# Patient Record
Sex: MALE | Race: WHITE | Employment: FULL TIME | URBAN - METROPOLITAN AREA
[De-identification: names, ages, dates, MRNs, and addresses within clinical notes are randomized per-mention and may not be internally consistent; named-entity substitution may affect disease eponyms.]

---

## 2017-05-01 ENCOUNTER — TELEPHONE (OUTPATIENT)
Dept: PAIN MANAGEMENT | Age: 47
End: 2017-05-01

## 2017-05-02 ENCOUNTER — TELEPHONE (OUTPATIENT)
Dept: PAIN MANAGEMENT | Age: 47
End: 2017-05-02

## 2019-09-14 ENCOUNTER — APPOINTMENT (OUTPATIENT)
Dept: GENERAL RADIOLOGY | Age: 49
End: 2019-09-14
Payer: COMMERCIAL

## 2019-09-14 ENCOUNTER — HOSPITAL ENCOUNTER (EMERGENCY)
Age: 49
Discharge: HOME | End: 2019-09-14
Payer: COMMERCIAL

## 2019-09-14 VITALS
SYSTOLIC BLOOD PRESSURE: 125 MMHG | TEMPERATURE: 99.5 F | HEIGHT: 68 IN | OXYGEN SATURATION: 96 % | DIASTOLIC BLOOD PRESSURE: 111 MMHG | RESPIRATION RATE: 18 BRPM | WEIGHT: 181 LBS | HEART RATE: 87 BPM | BODY MASS INDEX: 27.43 KG/M2

## 2019-09-14 DIAGNOSIS — T14.8XXA SUTURED SKIN WOUND: ICD-10-CM

## 2019-09-14 DIAGNOSIS — S61.220A LACERATION OF RIGHT INDEX FINGER WITH FOREIGN BODY WITHOUT DAMAGE TO NAIL, INITIAL ENCOUNTER: ICD-10-CM

## 2019-09-14 DIAGNOSIS — S61.212A LACERATION OF RIGHT MIDDLE FINGER WITHOUT FOREIGN BODY WITHOUT DAMAGE TO NAIL, INITIAL ENCOUNTER: ICD-10-CM

## 2019-09-14 DIAGNOSIS — Z23 TETANUS-DIPHTHERIA (TD) VACCINATION: ICD-10-CM

## 2019-09-14 DIAGNOSIS — S61.121A LACERATION OF RIGHT THUMB WITH FOREIGN BODY AND DAMAGE TO NAIL, INITIAL ENCOUNTER: Primary | ICD-10-CM

## 2019-09-14 PROCEDURE — 4500000023 HC ED LEVEL 3 PROCEDURE

## 2019-09-14 PROCEDURE — 90471 IMMUNIZATION ADMIN: CPT | Performed by: NURSE PRACTITIONER

## 2019-09-14 PROCEDURE — 6360000002 HC RX W HCPCS: Performed by: NURSE PRACTITIONER

## 2019-09-14 PROCEDURE — 73130 X-RAY EXAM OF HAND: CPT

## 2019-09-14 PROCEDURE — 90715 TDAP VACCINE 7 YRS/> IM: CPT | Performed by: NURSE PRACTITIONER

## 2019-09-14 PROCEDURE — 99283 EMERGENCY DEPT VISIT LOW MDM: CPT

## 2019-09-14 PROCEDURE — 2500000003 HC RX 250 WO HCPCS: Performed by: NURSE PRACTITIONER

## 2019-09-14 RX ORDER — LIDOCAINE HYDROCHLORIDE 10 MG/ML
5 INJECTION, SOLUTION EPIDURAL; INFILTRATION; INTRACAUDAL; PERINEURAL ONCE
Status: COMPLETED | OUTPATIENT
Start: 2019-09-14 | End: 2019-09-14

## 2019-09-14 RX ORDER — CEPHALEXIN 500 MG/1
500 CAPSULE ORAL 4 TIMES DAILY
Qty: 40 CAPSULE | Refills: 0 | Status: SHIPPED | OUTPATIENT
Start: 2019-09-14 | End: 2019-09-24

## 2019-09-14 RX ORDER — BUPIVACAINE HYDROCHLORIDE 5 MG/ML
30 INJECTION, SOLUTION EPIDURAL; INTRACAUDAL ONCE
Status: DISCONTINUED | OUTPATIENT
Start: 2019-09-14 | End: 2019-09-14

## 2019-09-14 RX ORDER — HYDROCODONE BITARTRATE AND ACETAMINOPHEN 5; 325 MG/1; MG/1
1 TABLET ORAL EVERY 6 HOURS PRN
Qty: 7 TABLET | Refills: 0 | Status: SHIPPED | OUTPATIENT
Start: 2019-09-14 | End: 2019-09-17

## 2019-09-14 RX ADMIN — LIDOCAINE HYDROCHLORIDE 5 ML: 10 INJECTION, SOLUTION EPIDURAL; INFILTRATION; INTRACAUDAL; PERINEURAL at 14:37

## 2019-09-14 RX ADMIN — TETANUS TOXOID, REDUCED DIPHTHERIA TOXOID AND ACELLULAR PERTUSSIS VACCINE, ADSORBED 0.5 ML: 5; 2.5; 8; 8; 2.5 SUSPENSION INTRAMUSCULAR at 14:37

## 2019-09-14 ASSESSMENT — PAIN DESCRIPTION - PAIN TYPE: TYPE: ACUTE PAIN

## 2019-09-14 ASSESSMENT — PAIN DESCRIPTION - LOCATION: LOCATION: HAND

## 2019-09-14 ASSESSMENT — PAIN SCALES - GENERAL
PAINLEVEL_OUTOF10: 7
PAINLEVEL_OUTOF10: 0
PAINLEVEL_OUTOF10: 7

## 2019-09-14 ASSESSMENT — PAIN DESCRIPTION - DESCRIPTORS: DESCRIPTORS: THROBBING

## 2019-09-14 ASSESSMENT — PAIN DESCRIPTION - FREQUENCY: FREQUENCY: CONTINUOUS

## 2019-09-14 ASSESSMENT — PAIN DESCRIPTION - ONSET: ONSET: SUDDEN

## 2019-09-14 ASSESSMENT — PAIN - FUNCTIONAL ASSESSMENT
PAIN_FUNCTIONAL_ASSESSMENT: 0-10
PAIN_FUNCTIONAL_ASSESSMENT: ACTIVITIES ARE NOT PREVENTED

## 2019-09-14 ASSESSMENT — PAIN DESCRIPTION - PROGRESSION: CLINICAL_PROGRESSION: GRADUALLY WORSENING

## 2019-09-14 ASSESSMENT — PAIN DESCRIPTION - ORIENTATION: ORIENTATION: RIGHT

## 2019-09-14 NOTE — ED NOTES
Pt's wounds to right hand cleaned with hibiclens and ns/pt tolerated this process well.      Cheryl Lee RN  09/14/19 8027

## 2019-09-14 NOTE — ED PROVIDER NOTES
[facility]  [address] [facility] [address]  Dept: [phone]  Loc: [address] ENCOUNTER        This patient was not seen or evaluated by the attending physician. I evaluated this patient, the attending physician was available for consultation. CHIEF COMPLAINT    Chief Complaint   Patient presents with    Laceration     pt presents to ED with laceration to right 1st digit and RIF s/p he was usig a  and cut his finger less than 1 hr to arrival at ED. HPI    Arun Kirk is a 51 y.o. male who lacerated right thumb and 2nd digit. The mechanism of the injury was he was working on a car, the tool slipped and he accidentally cut his right thumb and second digit. This occurred shortly prior to arrival.  Associated bleeding was alleviated by pressure. Patient denies any pain currently. He does not know when his last tetanus shot was. Came to the ED for further evaluation and treatment.     REVIEW OF SYSTEMS    Neurologic: No numbness or weakness distal to the wound  Skin: see HPI  Musculoskeletal: No bony deformity  Immunization: Tetanus status unknown, will be updated in the ED    PAST MEDICAL & SURGICAL HISTORY    Past Medical History:   Diagnosis Date    Back pain     COPD [facility]chronic obstructive pulmonary disease) ([facility] [address]     Insomnia      Past Surgical History:   Procedure Laterality Date    ARM SURGERY Left     due to fracture    BACK SURGERY      FRACTURE SURGERY      skull fracture surgery    LEG SURGERY Right     fracture surgery    OTHER SURGICAL HISTORY      excisional debridement, culture, yimi arms and left foot    PELVIC FRACTURE SURGERY         CURRENT MEDICATIONS    Current Outpatient Rx   Medication Sig Dispense Refill    cephALEXin (KEFLEX) 500 MG capsule Take 1 capsule by mouth 4 times daily for 10 days 40 capsule 0    HYDROcodone-acetaminophen (NORCO) 5-325 MG per tablet Take 1  High Cholesterol Father        PHYSICAL EXAM    VITAL SIGNS: BP (!) 125/111   Pulse 87   Temp 99.5 °F (37.5 °C) (Oral)   Resp 18   Ht 5' 8\" (1.727 m)   Wt 181 lb (82.1 kg)   SpO2 96%   BMI 27.52 kg/m²   Constitutional:  Well developed, well-nourished  HENT:  atraumatic, no trismus  NECK:  Supple, No neck swelling  Respiratory:  No respiratory distress  Cardiovascular:  No JVD   Neurologic: Motor and sensory distal to the wound is intact and normal, patient is awake, alert, no slurred speech  Vascular: right radial pulse 2+, capillary refill less than 2 seconds  Musculoskeletal:  No edema or deformity  Integument:  Approx. 1 cm laceration localized over the right thumb with multiple shearing wounds surrounding and a 0.5cm laceration noted over the right 2nd digit, the depth down to the subcutaneous tissue, there is no foreign body in the wound, there is no tendon or bone exposed in the wound. It has also a third superficial laceration not extending into the subcutaneous tissue of the middle finger of the right hand. This was cleansed thoroughly with no need for suture repair. PROCEDURE  Laceration Repair Procedure Note; right 2nd digit lac repair  Performed by: myself  Consent:  Verbal consent obtained by patient. Risk of infection and pain discussed, as well as alternatives. Anesthesia:  The patient was placed in the appropriate position and anesthesia obtained by infiltration using 1% Lidocaine without epinephrine.   Repair type:  intermediate - wound was contaminated and required extensive cleansing  Pre-procedure:  Patient was prepped and draped in usual sterile fashion   Laceration details:    Location: right second digit    Length (cm):  1  Preparation:  Patient was prepped and draped in usual sterile fashion   Exploration: Hemostasis achieved with direct pressure, entire depth of wound probed and visualized    Contaminated: yes; extensive high pressure irrigation and cleansing done with Hibiclens  Treatment:   Amount of cleaning:  Extensive     Irrigation solution:  High pressure sterile water  Visualized foreign bodies/material removed: no    Skin repair:   Repair method:  Sutures  Suture size:  4-0  Suture material:  Nylon  Suture technique:  Simple interrupted  Approximation:  Close  Number of sutures: 2  Dressing:  Sterile dressing and antibiotic ointment  Patient tolerance of procedure: Tolerated well, no immediate complications      Laceration Repair Procedure Note; right thumb  Performed by: myself  Consent:  Verbal consent obtained by patient. Risk of infection and pain discussed, as well as alternatives. Anesthesia:  The patient was placed in the appropriate position and anesthesia obtained by infiltration using 1% Lidocaine without epinephrine. Repair type:  complex- wound was contaminated and required extensive cleansing  Pre-procedure:  Patient was prepped and draped in usual sterile fashion   Laceration details:    Location: right thumb    Length (cm):  approx 1cm with multiple sheering wound surrounding main laceration  Preparation:  Patient was prepped and draped in usual sterile fashion   Exploration: Hemostasis achieved with direct pressure, entire depth of wound probed and visualized    Contaminated: yes; had extensive amount of cleaning performed with entire wound bed probed and visualized with no foreign body retention noted. Hibiclens used in cleansing  Treatment:   Amount of cleaning:  Extensive     Irrigation solution:  High pressure sterile water  Visualized foreign bodies/material removed: no    Skin repair:   Repair method:  Sutures  Suture size:  4-0  Suture material:  Nylon  Suture technique:  Simple interrupted  Approximation:  Close  Number of sutures: 18  Dressing:  Sterile dressing and antibiotic ointment  Patient tolerance of procedure:   Tolerated well, no immediate complications      RADIOLOGY  XR HAND RIGHT (MIN 3 VIEWS)   Final Result   Soft tissue PLAN  Discharge with outpatient follow-up (see EMR)      (Please note that this note was completed with a voice recognition program.  Every attempt was made to edit the dictations, but inevitably there remain words that are mis-transcribed.)      BROOKLYNN Romano - JESÚS  09/14/19 0513

## 2023-01-31 ENCOUNTER — HOSPITAL ENCOUNTER (OUTPATIENT)
Dept: WOUND CARE | Age: 53
Discharge: HOME | End: 2023-01-31

## 2023-02-02 ENCOUNTER — HOSPITAL ENCOUNTER (OUTPATIENT)
Dept: WOUND CARE | Age: 53
Discharge: HOME | End: 2023-02-02
Payer: MEDICAID

## 2023-02-02 VITALS
TEMPERATURE: 98.8 F | BODY MASS INDEX: 25.83 KG/M2 | SYSTOLIC BLOOD PRESSURE: 125 MMHG | DIASTOLIC BLOOD PRESSURE: 74 MMHG | HEART RATE: 102 BPM | WEIGHT: 170.42 LBS | HEIGHT: 68 IN | RESPIRATION RATE: 18 BRPM

## 2023-02-02 DIAGNOSIS — F19.10 DRUG ABUSE, IV (HCC): ICD-10-CM

## 2023-02-02 DIAGNOSIS — S41.102A OPEN WOUND OF ARM, BILATERAL: Primary | ICD-10-CM

## 2023-02-02 DIAGNOSIS — L03.119 CELLULITIS OF UPPER EXTREMITY, UNSPECIFIED LATERALITY: ICD-10-CM

## 2023-02-02 DIAGNOSIS — S41.101A OPEN WOUND OF ARM, BILATERAL: Primary | ICD-10-CM

## 2023-02-02 DIAGNOSIS — L02.213 CUTANEOUS ABSCESS OF CHEST WALL: ICD-10-CM

## 2023-02-02 PROBLEM — L02.91 SKIN ABSCESS: Status: ACTIVE | Noted: 2023-02-02

## 2023-02-02 PROCEDURE — 11042 DBRDMT SUBQ TIS 1ST 20SQCM/<: CPT | Performed by: SURGERY

## 2023-02-02 PROCEDURE — 11045 DBRDMT SUBQ TISS EACH ADDL: CPT

## 2023-02-02 PROCEDURE — 11045 DBRDMT SUBQ TISS EACH ADDL: CPT | Performed by: SURGERY

## 2023-02-02 PROCEDURE — 99204 OFFICE O/P NEW MOD 45 MIN: CPT | Performed by: SURGERY

## 2023-02-02 PROCEDURE — 11042 DBRDMT SUBQ TIS 1ST 20SQCM/<: CPT

## 2023-02-02 PROCEDURE — 99203 OFFICE O/P NEW LOW 30 MIN: CPT

## 2023-02-02 RX ORDER — CLOBETASOL PROPIONATE 0.5 MG/G
OINTMENT TOPICAL ONCE
OUTPATIENT
Start: 2023-02-02 | End: 2023-02-02

## 2023-02-02 RX ORDER — GENTAMICIN SULFATE 1 MG/G
OINTMENT TOPICAL ONCE
OUTPATIENT
Start: 2023-02-02 | End: 2023-02-02

## 2023-02-02 RX ORDER — LIDOCAINE 50 MG/G
OINTMENT TOPICAL ONCE
OUTPATIENT
Start: 2023-02-02 | End: 2023-02-02

## 2023-02-02 RX ORDER — LIDOCAINE 40 MG/G
CREAM TOPICAL ONCE
OUTPATIENT
Start: 2023-02-02 | End: 2023-02-02

## 2023-02-02 RX ORDER — BACITRACIN, NEOMYCIN, POLYMYXIN B 400; 3.5; 5 [USP'U]/G; MG/G; [USP'U]/G
OINTMENT TOPICAL ONCE
OUTPATIENT
Start: 2023-02-02 | End: 2023-02-02

## 2023-02-02 RX ORDER — LIDOCAINE HYDROCHLORIDE 20 MG/ML
JELLY TOPICAL ONCE
OUTPATIENT
Start: 2023-02-02 | End: 2023-02-02

## 2023-02-02 RX ORDER — LIDOCAINE HYDROCHLORIDE 40 MG/ML
SOLUTION TOPICAL ONCE
OUTPATIENT
Start: 2023-02-02 | End: 2023-02-02

## 2023-02-02 RX ORDER — BETAMETHASONE DIPROPIONATE 0.05 %
OINTMENT (GRAM) TOPICAL ONCE
OUTPATIENT
Start: 2023-02-02 | End: 2023-02-02

## 2023-02-02 RX ORDER — BACITRACIN ZINC AND POLYMYXIN B SULFATE 500; 1000 [USP'U]/G; [USP'U]/G
OINTMENT TOPICAL ONCE
OUTPATIENT
Start: 2023-02-02 | End: 2023-02-02

## 2023-02-02 RX ORDER — LIDOCAINE HYDROCHLORIDE 40 MG/ML
SOLUTION TOPICAL ONCE
Status: COMPLETED | OUTPATIENT
Start: 2023-02-02 | End: 2023-02-02

## 2023-02-02 RX ORDER — GINSENG 100 MG
CAPSULE ORAL ONCE
OUTPATIENT
Start: 2023-02-02 | End: 2023-02-02

## 2023-02-02 RX ADMIN — LIDOCAINE HYDROCHLORIDE: 40 SOLUTION TOPICAL at 16:06

## 2023-02-02 ASSESSMENT — PAIN - FUNCTIONAL ASSESSMENT
PAIN_FUNCTIONAL_ASSESSMENT: PREVENTS OR INTERFERES SOME ACTIVE ACTIVITIES AND ADLS
PAIN_FUNCTIONAL_ASSESSMENT: PREVENTS OR INTERFERES SOME ACTIVE ACTIVITIES AND ADLS

## 2023-02-02 ASSESSMENT — PAIN DESCRIPTION - ONSET
ONSET: ON-GOING
ONSET: ON-GOING

## 2023-02-02 ASSESSMENT — PAIN DESCRIPTION - FREQUENCY
FREQUENCY: INTERMITTENT
FREQUENCY: INTERMITTENT

## 2023-02-02 ASSESSMENT — PAIN DESCRIPTION - DESCRIPTORS
DESCRIPTORS: SHARP
DESCRIPTORS: ACHING;DISCOMFORT

## 2023-02-02 ASSESSMENT — PAIN SCALES - GENERAL
PAINLEVEL_OUTOF10: 2
PAINLEVEL_OUTOF10: 10

## 2023-02-02 ASSESSMENT — PAIN DESCRIPTION - LOCATION
LOCATION: ARM
LOCATION: ARM

## 2023-02-02 ASSESSMENT — PAIN DESCRIPTION - ORIENTATION
ORIENTATION: LEFT
ORIENTATION: LEFT;RIGHT

## 2023-02-02 ASSESSMENT — PAIN DESCRIPTION - PAIN TYPE
TYPE: ACUTE PAIN;CHRONIC PAIN
TYPE: ACUTE PAIN
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
## 2023-02-02 NOTE — PLAN OF CARE
Patient Name:  Tayo Camara  YOB: 1970  Today's Date:  February 2, 2023  Medical Record Number:  [id number]  Provider:    [address] [place] [address] Pkwy   Appointment Treatment Guidelines        The [address] [place] [address] Pkwy Appointment Treatment Guidelines were reviewed on February 2, 2023 with the patient. Mr. Chris Bowser understanding of the [address] [place] [address] Pkwy Appointment Treatment Guidelines.       Electronically signed by Jose Mesa RN on 2/2/23 at 4:03 PM EST

## 2023-02-02 NOTE — PLAN OF CARE
[address]:     [facility] [address] [facility] Nichelle Bunch  R:[phone] f: [phone]     [place]:     [address]  [address]  [facility] [place] [address]  [phone]  WOUND CARE Dept: [phone]   [facility] NUMBER [unfilled]    Patient Information:      Jose Charles  [address] [facility]   [phone]   : 1970  AGE: 47 y.o. GENDER: male   TODAYS DATE:  2023    Insurance:      PRIMARY INSURANCE:  Plan: [address] [place] [address] DEPT OF JOB  Coverage: [place]  Effective Date: 2022  [id number] - (Medicaid)    SECONDARY INSURANCE:  Plan:   Coverage:   Effective Date:   [unfilled]    [unfilled]   [unfilled]     Patient Wound Information:      Problem List Items Addressed This Visit          Other    Skin abscess    Drug abuse, IV ([address]    Open wound of arm, bilateral     ICD-10 codes: K61.301K ; S41.102A    WOUNDS REQUIRING DRESSING SUPPLIES:     Wound 23 Arm Left #1 (Noted 2022) (Active)   Wound Image    23 1605   Dressing/Treatment Gauze dressing/dressing sponge;ABD;Pharmaceutical agent (see MAR); Moisten with saline; Roll gauze 23 1715   Wound Length (cm) 17.5 cm 23 1605   Wound Width (cm) 29 cm 23 1605   Wound Depth (cm) 1 cm 23 1605   Wound Surface Area (cm^2) 507.5 cm^2 23 1605   Wound Volume (cm^3) 507.5 cm^3 23 1605   Post-Procedure Length (cm) 17.6 cm 23 1632   Post-Procedure Width (cm) 29.1 cm 23 1632   Post-Procedure Depth (cm) 1 cm 23 1632   Post-Procedure Surface Area (cm^2) 512.16 cm^2 23 1632   Post-Procedure Volume (cm^3) 512.16 cm^3 23 1632   Wound Assessment Eschar dry;Granulation tissue;Slough 23 1605   Drainage Amount None 23 1605   Odor None 23 1605   Irina-wound Assessment Dry/flaky;Edematous; Blanchable erythema 23 1605 Margins Undefined edges 02/02/23 1605   Wound Thickness Description not for Pressure Injury Full thickness 02/02/23 1605   Number of days: 0       Wound 02/02/23 Arm Right #2 (Noted 2/2022) (Active)   Wound Image    02/02/23 1605   Dressing/Treatment Gauze dressing/dressing sponge;ABD;Moisten with saline; Roll gauze; Pharmaceutical agent (see MAR) 02/02/23 1715   Wound Length (cm) 19 cm 02/02/23 1605   Wound Width (cm) 30 cm 02/02/23 1605   Wound Depth (cm) 0.8 cm 02/02/23 1605   Wound Surface Area (cm^2) 570 cm^2 02/02/23 1605   Wound Volume (cm^3) 456 cm^3 02/02/23 1605   Post-Procedure Length (cm) 19 cm 02/02/23 1632   Post-Procedure Width (cm) 30 cm 02/02/23 1632   Post-Procedure Depth (cm) 0.8 cm 02/02/23 1632   Post-Procedure Surface Area (cm^2) 570 cm^2 02/02/23 1632   Post-Procedure Volume (cm^3) 456 cm^3 02/02/23 1632   Wound Assessment Eschar dry;Granulation tissue;Slough 02/02/23 1605   Drainage Amount None 02/02/23 1605   Odor None 02/02/23 1605   Irina-wound Assessment Dry/flaky; Blanchable erythema;Edematous 02/02/23 1605   Margins Undefined edges 02/02/23 1605   Wound Thickness Description not for Pressure Injury Full thickness 02/02/23 1605   Number of days: 0          Supplies Requested :      WOUND #: 1 and 2   PRIMARY DRESSING:  None   Cover and Secure with: 4X4 gauze pad  ABD pad  Bulky roll gauze     FREQUENCY OF DRESSING CHANGES:  Daily           ADDITIONAL ITEMS:  [] Gloves Small  [] Gloves Medium [x] Gloves Large [] Gloves XLarge  [x] Tape 1\" [] Tape 2\" [] Tape 3\"  [] Medipore Tape  [x] Saline  [] Skin Prep   [] Adhesive Remover   [] Cotton Tip Applicators   [] Other:    Patient Wound(s) Debrided: [x] Yes   [] No    Debribement Type: subcutaneous tissue    Debridement Date: 2/2/2023    Patient currently being seen by Home Health: [] Yes   [x] No    Duration for needed supplies:  []15  []30  []60  [x]90 Days    Provider Information:      PROVIDER'S NAME: Alba Ramos MD     NPI: ALICIA BILLY [id number]

## 2023-02-02 NOTE — DISCHARGE INSTRUCTIONS
[address] Physician Orders and Discharge [facility] 91  [address] [facility], [address] [place] [address], [place] [address]  Telephone: [phone]  [address] 8:00 am - 4:30 pm and Friday 8:00 am - 12:00 pm.        NAME:  Paz Adam  YOB: 1970  MEDICAL RECORD NUMBER:  [id number]  DATE:  2/2/2023    Important Reminders:   Please wash hands with soap and water before and after every dressing change. Do not scrub wounds. Keep wounds dry in shower unless otherwise instructed by the physician. SMOKING can slow would healing. Stop smoking as soon as possible to improve healing and prevent further complications associated with smoking. Irina-Wound Topical Treatments:  Do not apply lotions, creams, or ointments to wound bed unless directed. [] Apply moisturizing lotion to skin surrounding the wound prior to dressing change.  [] Apply antifungal ointment to skin surrounding the wound prior to dressing change.  [] Apply thin film of no sting moisture barrier ointment to skin immediately around      wound. [] Other:       Wound Location: RIGHT AND LEFT LOWER ARM WOUNDS    Wound Cleansing:     Primary Dressing:  [x] SANTYL ( APPLY NICKEL THICK TO ENTIRE WOUND ) THEN GAUZE SLIGHTLY DAMPENED WITH SALINE  [x]     Secondary Dressing:  [x] DRY GAUZE  [x] ROLL GAUZE      Dressing Frequency:  [x] DAILY  [] Do Not Change Dressing                                        [] Assistive Devices     Use as instructed by the provider      Activity: Activity as Tolerated      Dietary:   Continue your diet as tolerated. Protein is a key nutrient in helping to repair damaged tissue and promote new tissue growth. Good sources of protein include milk, yogurt, cheese, fish, lean meat and beans. If you are DIABETIC, having diabetes can make it hard for wounds to heal. Try to keep your blood sugar within it's target range.   Limit Sodium, Alcohol and Sugar. Pain:   Please Note some pain, drainage and/or bleeding might be expected after seeing the provider. TO HELP ALLEVIATE PAIN WE RECOMMEND THE FOLLOWING  Elevate the affected limb. Use over the counter medications as permitted by your family doctor. For Persistent Pain not relieved by the above interventions, please notify your family doctor. Return Appointment:  [x] Return Appointment: With DR Archie Aldana  in  1 Week(s)  [x] Wound and dressing supply provider: [facility]  [] ECF or Home Healthcare:  [] Wound Assessment: [] Physician or NP scheduled for Wound Assessment:   [] Orders placed during your visit:    **COMPLETE ANTIBIOTICS ORDERED BY YOUR FAMILY DOCTOR**      : Darell Stuart     Electronically signed by Ester Reddy RN on 2/2/2023 at 3:59 PM       [address] [facility] Information: Should you experience any significant changes in your wound(s) or have questions about your wound care, please contact the [address] [place] [address] at [address] 8:00 am - 4:30 pm and Friday 8:00 am - 12:30 pm.  If you need help with your wound outside these hours and cannot wait until we are again available, contact your PCP or go to the hospital emergency room. PLEASE NOTE: IF YOU ARE UNABLE TO OBTAIN WOUND SUPPLIES, CONTINUE TO USE THE SUPPLIES YOU HAVE AVAILABLE UNTIL YOU ARE ABLE TO REACH US. IT IS MOST IMPORTANT TO KEEP THE WOUND COVERED AT ALL TIMES.      Physician Signature:_______________________    Date: ___________ Time:  ____________          Dr Koby Jacobson

## 2023-02-03 NOTE — DISCHARGE INSTRUCTIONS
[address] Physician Orders and Discharge [facility] 91  [address], [address] [place] [address]  Telephone: [phone]  [address] 8:00 am - 4:30 pm and Friday 8:00 am - 12:00 pm.          NAME:  Haily Kennedy  YOB: 1970  MEDICAL RECORD NUMBER:  [id number]  DATE:  2/9/2023     Important Reminders:   Please wash hands with soap and water before and after every dressing change. Do not scrub wounds. Keep wounds dry in shower unless otherwise instructed by the physician. SMOKING can slow would healing. Stop smoking as soon as possible to improve healing and prevent further complications associated with smoking. Irina-Wound Topical Treatments:  Do not apply lotions, creams, or ointments to wound bed unless directed. [] Apply moisturizing lotion to skin surrounding the wound prior to dressing change.  [] Apply antifungal ointment to skin surrounding the wound prior to dressing change.  [] Apply thin film of no sting moisture barrier ointment to skin immediately around      wound. [] Other:         Wound Location: RIGHT AND LEFT LOWER ARM WOUNDS     Wound Cleansing:      Primary Dressing:  [x] SANTYL ( APPLY NICKEL THICK TO ENTIRE WOUND ) THEN GAUZE SLIGHTLY DAMPENED WITH SALINE  [x] ADAPTIC     Secondary Dressing:  [x] DRY GAUZE  [x] ROLL GAUZE        Dressing Frequency:  [x] DAILY  [] Do Not Change Dressing                                                   [] Assistive Devices     Use as instructed by the provider        Activity: Activity as Tolerated        Dietary:   Continue your diet as tolerated. Protein is a key nutrient in helping to repair damaged tissue and promote new tissue growth. Good sources of protein include milk, yogurt, cheese, fish, lean meat and beans.   If you are DIABETIC, having diabetes can make it hard for wounds to heal. Try to keep your blood sugar within it's target range. Limit Sodium, Alcohol and Sugar. Pain:   Please Note some pain, drainage and/or bleeding might be expected after seeing the provider. TO HELP ALLEVIATE PAIN WE RECOMMEND THE FOLLOWING  Elevate the affected limb. Use over the counter medications as permitted by your family doctor. For Persistent Pain not relieved by the above interventions, please notify your family doctor. Return Appointment:  [x] Return Appointment: With DR Ezekiel Tran  in  1 Week(s)  [x] Wound and dressing supply provider: [facility]  [] ECF or Home Healthcare:  [] Wound Assessment:         [] Physician or NP scheduled for Wound Assessment:   [] Orders placed during your visit:     **COMPLETE ANTIBIOTICS ORDERED BY YOUR FAMILY DOCTOR**        : Bg Manzano      Electronically signed by Leonardo Padilla RN on 2/9/2023 at [address] [place] [address] Information: Should you experience any significant changes in your wound(s) or have questions about your wound care, please contact the [address] [place] [address] at [address] 8:00 am - 4:30 pm and Friday 8:00 am - 12:30 pm.  If you need help with your wound outside these hours and cannot wait until we are again available, contact your PCP or go to the hospital emergency room. PLEASE NOTE: IF YOU ARE UNABLE TO OBTAIN WOUND SUPPLIES, CONTINUE TO USE THE SUPPLIES YOU HAVE AVAILABLE UNTIL YOU ARE ABLE TO REACH US. IT IS MOST IMPORTANT TO KEEP THE WOUND COVERED AT ALL TIMES.      Physician Signature:_______________________     Date: ___________ Time:  ____________                                  Dr Vargas Lutz

## 2023-02-06 ENCOUNTER — HOSPITAL ENCOUNTER (OUTPATIENT)
Dept: WOUND CARE | Age: 53
Discharge: HOME | End: 2023-02-06
Payer: MEDICAID

## 2023-02-06 VITALS
DIASTOLIC BLOOD PRESSURE: 70 MMHG | SYSTOLIC BLOOD PRESSURE: 125 MMHG | TEMPERATURE: 98.1 F | HEART RATE: 101 BPM | RESPIRATION RATE: 18 BRPM

## 2023-02-06 DIAGNOSIS — L03.119 CELLULITIS OF UPPER EXTREMITY, UNSPECIFIED LATERALITY: ICD-10-CM

## 2023-02-06 DIAGNOSIS — L02.213 CUTANEOUS ABSCESS OF CHEST WALL: ICD-10-CM

## 2023-02-06 DIAGNOSIS — S41.101A OPEN WOUND OF ARM, BILATERAL: Primary | ICD-10-CM

## 2023-02-06 DIAGNOSIS — F19.10 DRUG ABUSE, IV (HCC): ICD-10-CM

## 2023-02-06 DIAGNOSIS — S41.102A OPEN WOUND OF ARM, BILATERAL: Primary | ICD-10-CM

## 2023-02-06 PROCEDURE — 99213 OFFICE O/P EST LOW 20 MIN: CPT

## 2023-02-06 RX ORDER — LIDOCAINE HYDROCHLORIDE 20 MG/ML
JELLY TOPICAL ONCE
OUTPATIENT
Start: 2023-02-06 | End: 2023-02-06

## 2023-02-06 RX ORDER — LIDOCAINE 40 MG/G
CREAM TOPICAL ONCE
OUTPATIENT
Start: 2023-02-06 | End: 2023-02-06

## 2023-02-06 RX ORDER — BACITRACIN, NEOMYCIN, POLYMYXIN B 400; 3.5; 5 [USP'U]/G; MG/G; [USP'U]/G
OINTMENT TOPICAL ONCE
OUTPATIENT
Start: 2023-02-06 | End: 2023-02-06

## 2023-02-06 RX ORDER — BETAMETHASONE DIPROPIONATE 0.05 %
OINTMENT (GRAM) TOPICAL ONCE
OUTPATIENT
Start: 2023-02-06 | End: 2023-02-06

## 2023-02-06 RX ORDER — LIDOCAINE HYDROCHLORIDE 40 MG/ML
SOLUTION TOPICAL ONCE
Status: CANCELLED | OUTPATIENT
Start: 2023-02-06 | End: 2023-02-06

## 2023-02-06 RX ORDER — LIDOCAINE 50 MG/G
OINTMENT TOPICAL ONCE
OUTPATIENT
Start: 2023-02-06 | End: 2023-02-06

## 2023-02-06 RX ORDER — GINSENG 100 MG
CAPSULE ORAL ONCE
OUTPATIENT
Start: 2023-02-06 | End: 2023-02-06

## 2023-02-06 RX ORDER — CLOBETASOL PROPIONATE 0.5 MG/G
OINTMENT TOPICAL ONCE
OUTPATIENT
Start: 2023-02-06 | End: 2023-02-06

## 2023-02-06 RX ORDER — BACITRACIN ZINC AND POLYMYXIN B SULFATE 500; 1000 [USP'U]/G; [USP'U]/G
OINTMENT TOPICAL ONCE
OUTPATIENT
Start: 2023-02-06 | End: 2023-02-06

## 2023-02-06 RX ORDER — GENTAMICIN SULFATE 1 MG/G
OINTMENT TOPICAL ONCE
OUTPATIENT
Start: 2023-02-06 | End: 2023-02-06

## 2023-02-06 ASSESSMENT — PAIN DESCRIPTION - FREQUENCY
FREQUENCY: INTERMITTENT
FREQUENCY: INTERMITTENT

## 2023-02-06 ASSESSMENT — PAIN SCALES - GENERAL
PAINLEVEL_OUTOF10: 6
PAINLEVEL_OUTOF10: 6

## 2023-02-06 ASSESSMENT — PAIN DESCRIPTION - ONSET
ONSET: ON-GOING
ONSET: ON-GOING

## 2023-02-06 ASSESSMENT — PAIN DESCRIPTION - PAIN TYPE
TYPE: ACUTE PAIN
TYPE: ACUTE PAIN

## 2023-02-06 ASSESSMENT — PAIN DESCRIPTION - LOCATION
LOCATION: ARM
LOCATION: ARM

## 2023-02-06 ASSESSMENT — PAIN DESCRIPTION - ORIENTATION
ORIENTATION: LEFT;RIGHT
ORIENTATION: RIGHT;LEFT

## 2023-02-06 ASSESSMENT — PAIN DESCRIPTION - DESCRIPTORS
DESCRIPTORS: BURNING
DESCRIPTORS: BURNING

## 2023-02-06 NOTE — DISCHARGE INSTRUCTIONS
Discharge [address] [facility] Physician Orders and Discharge [facility] 91  [address] [facility], [address] [place] [address]  Telephone: [phone]  [address] 8:00 am - 4:30 pm and Friday 8:00 am - 12:00 pm.          NAME:  Asha Gregg  YOB: 1970  MEDICAL RECORD NUMBER:  [id number]  DATE:  2/2/2023     Important Reminders:   Please wash hands with soap and water before and after every dressing change. Do not scrub wounds. Keep wounds dry in shower unless otherwise instructed by the physician. SMOKING can slow would healing. Stop smoking as soon as possible to improve healing and prevent further complications associated with smoking. Irina-Wound Topical Treatments:  Do not apply lotions, creams, or ointments to wound bed unless directed. [] Apply moisturizing lotion to skin surrounding the wound prior to dressing change.  [] Apply antifungal ointment to skin surrounding the wound prior to dressing change.  [] Apply thin film of no sting moisture barrier ointment to skin immediately around      wound. [] Other:         Wound Location: RIGHT AND LEFT LOWER ARM WOUNDS     Wound Cleansing:      Primary Dressing:  [x] SANTYL ( APPLY NICKEL THICK TO ENTIRE WOUND ) THEN GAUZE SLIGHTLY DAMPENED WITH SALINE  [x]      Secondary Dressing:  [x] DRY GAUZE  [x] ROLL GAUZE        Dressing Frequency:  [x] DAILY  [] Do Not Change Dressing                                                   [] Assistive Devices     Use as instructed by the provider        Activity: Activity as Tolerated        Dietary:   Continue your diet as tolerated. Protein is a key nutrient in helping to repair damaged tissue and promote new tissue growth. Good sources of protein include milk, yogurt, cheese, fish, lean meat and beans.   If you are DIABETIC, having diabetes can make it hard for wounds to heal. Try to keep your blood sugar within it's target range. Limit Sodium, Alcohol and Sugar. Pain:   Please Note some pain, drainage and/or bleeding might be expected after seeing the provider. TO HELP ALLEVIATE PAIN WE RECOMMEND THE FOLLOWING  Elevate the affected limb. Use over the counter medications as permitted by your family doctor. For Persistent Pain not relieved by the above interventions, please notify your family doctor. Return Appointment:  [x] Return Appointment: With DR Richard Sheppard  in  1 Week(s)  [x] Wound and dressing supply provider: [facility]  [] ECF or Home Healthcare:  [] Wound Assessment:         [] Physician or NP scheduled for Wound Assessment:   [] Orders placed during your visit:     **COMPLETE ANTIBIOTICS ORDERED BY YOUR FAMILY DOCTOR**        : Cristina Dejesus      Electronically signed by Neftali Willams RN on 2/2/2023 at 3:59 PM         [address] [facility] Information: Should you experience any significant changes in your wound(s) or have questions about your wound care, please contact the [address] [place] [address] at [address] 8:00 am - 4:30 pm and Friday 8:00 am - 12:30 pm.  If you need help with your wound outside these hours and cannot wait until we are again available, contact your PCP or go to the hospital emergency room. PLEASE NOTE: IF YOU ARE UNABLE TO OBTAIN WOUND SUPPLIES, CONTINUE TO USE THE SUPPLIES YOU HAVE AVAILABLE UNTIL YOU ARE ABLE TO REACH US. IT IS MOST IMPORTANT TO KEEP THE WOUND COVERED AT ALL TIMES.      Physician Signature:_______________________     Date: ___________ Time:  ____________                                  Dr Ben Benites

## 2023-02-08 NOTE — PROGRESS NOTES
Name_______________________________________Printed:____________________  Date and time of surgery_2/13/23  0930  MAIN_______________________Arrival Time:_0800_______________   1. The instructions given regarding when and if a patient needs to stop oral intake prior to surgery varies. Follow the specific instructions you were given                  _x__Nothing to eat or to drink after Midnight the night before.                   ____Carbo loading or instructions will be given to select patients-if you have been given those instructions -please do the following                           The evening before your surgery after dinner before midnight drink 40 ounces of gatorade. If you are diabetic use sugar free. The morning of surgery drink 40 ounces of water. This needs to be finished 3 hours prior to your surgery start time. 2. Take the following pills with a small sip of water on the morning of surgery: none                  Do not take blood pressure medications ending in pril or sartan the nicki prior to surgery or the morning of surgery. Dr Vitor Hartman patient are not to take any medications the AM of surgery. 3. Aspirin, Ibuprofen, Advil, Naproxen, Vitamin E and other Anti-inflammatory products and supplements should be stopped for 5 -7days before surgery or as directed by your physician. 4. Check with your Doctor regarding stopping Plavix, Coumadin,Eliquis, Lovenox,Effient,Pradaxa,Xarelto, Fragmin or other blood thinners and follow their instructions. 5. Do not smoke, and do not drink any alcoholic beverages 24 hours prior to surgery. This includes NA Beer. Refrain from the usage of any recreational drugs. 6. You may brush your teeth and gargle the morning of surgery. DO NOT SWALLOW WATER   7. You MUST make arrangements for a responsible adult to stay on site while you are here and take you home after your surgery. You will not be allowed to leave alone or drive yourself home.   It is strongly suggested someone stay with you the first 24 hrs. Your surgery will be cancelled if you do not have a ride home. 8. A parent/legal guardian must accompany a child scheduled for surgery and plan to stay at the hospital until the child is discharged. Please do not bring other children with you. 9. Please wear simple, loose fitting clothing to the hospital.  Cinthia Liebermane not bring valuables (money, credit cards, checkbooks, etc.) Do not wear any makeup (including no eye makeup) or nail polish on your fingers or toes. 10. DO NOT wear any jewelry or piercings on day of surgery. All body piercing jewelry must be removed. 11. If you have ___dentures, they will be removed before going to the OR; we will provide you a container. If you wear ___contact lenses or ___glasses, they will be removed; please bring a case for them. 12. Please see your family doctor/pediatrician for a history & physical and/or concerning medications. Bring any test results/reports from your physician's office. PCP__________________Phone___________H&P Appt. Date________             13 If you  have a Living Will and Durable Power of  for Healthcare, please bring in a copy. 15. Notify your Surgeon if you develop any illness between now and surgery  time, cough, cold, fever, sore throat, nausea, vomiting, etc.  Please notify your surgeon if you experience dizziness, shortness of breath or blurred vision between now & the time of your surgery             15. DO NOT shave your operative site 96 hours prior to surgery. For face & neck surgery, men may use an electric razor 48 hours prior to surgery. 16. Shower the night before or morning of surgery using an antibacterial soap or as you have been instructed. 17. To provide excellent care visitors will be limited to one in the room at any given time. 18.  Please bring picture ID and insurance card.              19. Visit our web site for additional information:  [email or web address]              20.During flu season no children under the age of 15 are permitted in the hospital for the safety of all patients. 21. If you take a long acting insulin in the evening only  take half of your usual  dose the night  before your procedure              22. If you use a c-pap please bring DOS if staying overnight,             23.For your convenience [address] [place] [address] has a pharmacy on site to fill your prescriptions. 24. If you use oxygen and have a portable tank please bring it  with you the DOS             25. Bring a complete list of all your medications with name and dose include any supplements. 26. Other: use your inhalers and nebs as needed. Bring the albuterol with you. *Please call pre admission testing if you any further questions   Albino Ashia         [address] [place] [address] [phone] [place] [address]. [facility]  [phone]   [address] [place] [address]       VISITOR POLICY(subject to change)    Current policy is 2 visitors per patient. No children. Mask is  at the discretion of the facility. Visiting hours are 8a-8p. Overnight visitors will be at the discretion of the nurse. All policies subject to change. All above information reviewed with patient in person or by phone. Patient verbalizes understanding. All questions and concerns addressed.                                                                                                  Patient/Rep_patient___________________                                                                                                                                    PRE OP INSTRUCTIONS

## 2023-02-08 NOTE — PROGRESS NOTES
Called and spoke with Dr. Patrica Sloan concerning patient's snorting of heroin 3-4 times per day. He ordered a urine drug screen day of surgery. He requested that I ask the patient to decrease his usage of heroin 24 hours prior to surgery. Called patient and informed him of decrease his heroin use 24 hours prior to surgery. He verbalized understanding.

## 2023-02-09 ENCOUNTER — HOSPITAL ENCOUNTER (OUTPATIENT)
Dept: WOUND CARE | Age: 53
Discharge: HOME | End: 2023-02-09
Payer: MEDICAID

## 2023-02-09 VITALS
RESPIRATION RATE: 18 BRPM | TEMPERATURE: 97.3 F | HEART RATE: 87 BPM | SYSTOLIC BLOOD PRESSURE: 109 MMHG | DIASTOLIC BLOOD PRESSURE: 69 MMHG

## 2023-02-09 DIAGNOSIS — F19.10 DRUG ABUSE, IV (HCC): ICD-10-CM

## 2023-02-09 DIAGNOSIS — S41.102A OPEN WOUND OF ARM, BILATERAL: Primary | ICD-10-CM

## 2023-02-09 DIAGNOSIS — S41.101A OPEN WOUND OF ARM, BILATERAL: Primary | ICD-10-CM

## 2023-02-09 DIAGNOSIS — L02.213 CUTANEOUS ABSCESS OF CHEST WALL: ICD-10-CM

## 2023-02-09 DIAGNOSIS — L03.119 CELLULITIS OF UPPER EXTREMITY, UNSPECIFIED LATERALITY: ICD-10-CM

## 2023-02-09 PROCEDURE — 99213 OFFICE O/P EST LOW 20 MIN: CPT

## 2023-02-09 PROCEDURE — 99212 OFFICE O/P EST SF 10 MIN: CPT | Performed by: SURGERY

## 2023-02-09 RX ORDER — GINSENG 100 MG
CAPSULE ORAL ONCE
OUTPATIENT
Start: 2023-02-09 | End: 2023-02-09

## 2023-02-09 RX ORDER — LIDOCAINE HYDROCHLORIDE 20 MG/ML
JELLY TOPICAL ONCE
OUTPATIENT
Start: 2023-02-09 | End: 2023-02-09

## 2023-02-09 RX ORDER — LIDOCAINE 50 MG/G
OINTMENT TOPICAL ONCE
OUTPATIENT
Start: 2023-02-09 | End: 2023-02-09

## 2023-02-09 RX ORDER — LIDOCAINE HYDROCHLORIDE 40 MG/ML
SOLUTION TOPICAL ONCE
Status: COMPLETED | OUTPATIENT
Start: 2023-02-09 | End: 2023-02-09

## 2023-02-09 RX ORDER — LIDOCAINE HYDROCHLORIDE 40 MG/ML
SOLUTION TOPICAL ONCE
OUTPATIENT
Start: 2023-02-09 | End: 2023-02-09

## 2023-02-09 RX ORDER — BACITRACIN ZINC AND POLYMYXIN B SULFATE 500; 1000 [USP'U]/G; [USP'U]/G
OINTMENT TOPICAL ONCE
OUTPATIENT
Start: 2023-02-09 | End: 2023-02-09

## 2023-02-09 RX ORDER — BETAMETHASONE DIPROPIONATE 0.05 %
OINTMENT (GRAM) TOPICAL ONCE
OUTPATIENT
Start: 2023-02-09 | End: 2023-02-09

## 2023-02-09 RX ORDER — BACITRACIN, NEOMYCIN, POLYMYXIN B 400; 3.5; 5 [USP'U]/G; MG/G; [USP'U]/G
OINTMENT TOPICAL ONCE
OUTPATIENT
Start: 2023-02-09 | End: 2023-02-09

## 2023-02-09 RX ORDER — LIDOCAINE 40 MG/G
CREAM TOPICAL ONCE
OUTPATIENT
Start: 2023-02-09 | End: 2023-02-09

## 2023-02-09 RX ORDER — CLOBETASOL PROPIONATE 0.5 MG/G
OINTMENT TOPICAL ONCE
OUTPATIENT
Start: 2023-02-09 | End: 2023-02-09

## 2023-02-09 RX ORDER — GENTAMICIN SULFATE 1 MG/G
OINTMENT TOPICAL ONCE
OUTPATIENT
Start: 2023-02-09 | End: 2023-02-09

## 2023-02-09 RX ADMIN — LIDOCAINE HYDROCHLORIDE: 40 SOLUTION TOPICAL at 16:14

## 2023-02-09 ASSESSMENT — PAIN DESCRIPTION - FREQUENCY
FREQUENCY: INTERMITTENT
FREQUENCY: INTERMITTENT

## 2023-02-09 ASSESSMENT — PAIN SCALES - GENERAL
PAINLEVEL_OUTOF10: 3
PAINLEVEL_OUTOF10: 6

## 2023-02-09 ASSESSMENT — PAIN DESCRIPTION - ONSET
ONSET: ON-GOING
ONSET: ON-GOING

## 2023-02-09 ASSESSMENT — PAIN DESCRIPTION - PAIN TYPE
TYPE: ACUTE PAIN
TYPE: ACUTE PAIN

## 2023-02-09 ASSESSMENT — PAIN DESCRIPTION - DESCRIPTORS
DESCRIPTORS: BURNING
DESCRIPTORS: BURNING

## 2023-02-09 ASSESSMENT — PAIN DESCRIPTION - LOCATION
LOCATION: ARM
LOCATION: ARM

## 2023-02-09 ASSESSMENT — PAIN DESCRIPTION - ORIENTATION
ORIENTATION: RIGHT;LEFT
ORIENTATION: RIGHT;LEFT
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
## 2023-02-13 ENCOUNTER — ANESTHESIA (OUTPATIENT)
Dept: OPERATING ROOM | Age: 53
End: 2023-02-13
Payer: MEDICAID

## 2023-02-13 ENCOUNTER — HOSPITAL ENCOUNTER (OUTPATIENT)
Age: 53
Setting detail: OUTPATIENT SURGERY
Discharge: HOME | End: 2023-02-13
Attending: SURGERY | Admitting: SURGERY
Payer: MEDICAID

## 2023-02-13 ENCOUNTER — ANESTHESIA EVENT (OUTPATIENT)
Dept: OPERATING ROOM | Age: 53
End: 2023-02-13
Payer: MEDICAID

## 2023-02-13 VITALS
RESPIRATION RATE: 16 BRPM | OXYGEN SATURATION: 93 % | BODY MASS INDEX: 25.46 KG/M2 | HEIGHT: 68 IN | DIASTOLIC BLOOD PRESSURE: 67 MMHG | HEART RATE: 72 BPM | SYSTOLIC BLOOD PRESSURE: 127 MMHG | WEIGHT: 168 LBS | TEMPERATURE: 97.5 F

## 2023-02-13 DIAGNOSIS — S41.102A OPEN WOUND OF ARM, BILATERAL: Primary | ICD-10-CM

## 2023-02-13 DIAGNOSIS — S41.101A OPEN WOUND OF ARM, BILATERAL: Primary | ICD-10-CM

## 2023-02-13 LAB
AMPHETAMINE SCREEN, URINE: ABNORMAL
BARBITURATE SCREEN URINE: ABNORMAL
BENZODIAZEPINE SCREEN, URINE: ABNORMAL
CANNABINOID SCREEN URINE: ABNORMAL
COCAINE METABOLITE SCREEN URINE: ABNORMAL
FENTANYL SCREEN, URINE: POSITIVE
Lab: ABNORMAL
METHADONE SCREEN, URINE: POSITIVE
OPIATE SCREEN URINE: ABNORMAL
OXYCODONE URINE: ABNORMAL
PH UA: 5
PHENCYCLIDINE SCREEN URINE: ABNORMAL

## 2023-02-13 PROCEDURE — 7100000010 HC PHASE II RECOVERY - FIRST 15 MIN: Performed by: SURGERY

## 2023-02-13 PROCEDURE — 6370000000 HC RX 637 (ALT 250 FOR IP): Performed by: SURGERY

## 2023-02-13 PROCEDURE — 3700000001 HC ADD 15 MINUTES (ANESTHESIA): Performed by: SURGERY

## 2023-02-13 PROCEDURE — 6360000002 HC RX W HCPCS: Performed by: NURSE ANESTHETIST, CERTIFIED REGISTERED

## 2023-02-13 PROCEDURE — 7100000001 HC PACU RECOVERY - ADDTL 15 MIN: Performed by: SURGERY

## 2023-02-13 PROCEDURE — 2500000003 HC RX 250 WO HCPCS: Performed by: NURSE ANESTHETIST, CERTIFIED REGISTERED

## 2023-02-13 PROCEDURE — A4217 STERILE WATER/SALINE, 500 ML: HCPCS | Performed by: SURGERY

## 2023-02-13 PROCEDURE — 80307 DRUG TEST PRSMV CHEM ANLYZR: CPT

## 2023-02-13 PROCEDURE — 2500000003 HC RX 250 WO HCPCS: Performed by: SURGERY

## 2023-02-13 PROCEDURE — 2580000003 HC RX 258: Performed by: NURSE ANESTHETIST, CERTIFIED REGISTERED

## 2023-02-13 PROCEDURE — 2709999900 HC NON-CHARGEABLE SUPPLY: Performed by: SURGERY

## 2023-02-13 PROCEDURE — 7100000011 HC PHASE II RECOVERY - ADDTL 15 MIN: Performed by: SURGERY

## 2023-02-13 PROCEDURE — 3600000012 HC SURGERY LEVEL 2 ADDTL 15MIN: Performed by: SURGERY

## 2023-02-13 PROCEDURE — 2580000003 HC RX 258: Performed by: SURGERY

## 2023-02-13 PROCEDURE — 3600000002 HC SURGERY LEVEL 2 BASE: Performed by: SURGERY

## 2023-02-13 PROCEDURE — 3700000000 HC ANESTHESIA ATTENDED CARE: Performed by: SURGERY

## 2023-02-13 PROCEDURE — 7100000000 HC PACU RECOVERY - FIRST 15 MIN: Performed by: SURGERY

## 2023-02-13 PROCEDURE — 11043 DBRDMT MUSC&/FSCA 1ST 20/<: CPT | Performed by: SURGERY

## 2023-02-13 PROCEDURE — 11046 DBRDMT MUSC&/FSCA EA ADDL: CPT | Performed by: SURGERY

## 2023-02-13 RX ORDER — DEXAMETHASONE SODIUM PHOSPHATE 4 MG/ML
INJECTION, SOLUTION INTRA-ARTICULAR; INTRALESIONAL; INTRAMUSCULAR; INTRAVENOUS; SOFT TISSUE PRN
Status: DISCONTINUED | OUTPATIENT
Start: 2023-02-13 | End: 2023-02-13 | Stop reason: SDUPTHER

## 2023-02-13 RX ORDER — BACITRACIN ZINC AND POLYMYXIN B SULFATE 500; 1000 [USP'U]/G; [USP'U]/G
OINTMENT TOPICAL
Status: COMPLETED | OUTPATIENT
Start: 2023-02-13 | End: 2023-02-13

## 2023-02-13 RX ORDER — MAGNESIUM HYDROXIDE 1200 MG/15ML
LIQUID ORAL CONTINUOUS PRN
Status: DISCONTINUED | OUTPATIENT
Start: 2023-02-13 | End: 2023-02-13 | Stop reason: HOSPADM

## 2023-02-13 RX ORDER — HYDRALAZINE HYDROCHLORIDE 20 MG/ML
10 INJECTION INTRAMUSCULAR; INTRAVENOUS
Status: DISCONTINUED | OUTPATIENT
Start: 2023-02-13 | End: 2023-02-13 | Stop reason: HOSPADM

## 2023-02-13 RX ORDER — BUPIVACAINE HYDROCHLORIDE AND EPINEPHRINE 5; 5 MG/ML; UG/ML
INJECTION, SOLUTION EPIDURAL; INTRACAUDAL; PERINEURAL
Status: COMPLETED | OUTPATIENT
Start: 2023-02-13 | End: 2023-02-13

## 2023-02-13 RX ORDER — MEPERIDINE HYDROCHLORIDE 25 MG/ML
12.5 INJECTION INTRAMUSCULAR; INTRAVENOUS; SUBCUTANEOUS EVERY 5 MIN PRN
Status: DISCONTINUED | OUTPATIENT
Start: 2023-02-13 | End: 2023-02-13 | Stop reason: HOSPADM

## 2023-02-13 RX ORDER — SODIUM CHLORIDE 9 MG/ML
INJECTION, SOLUTION INTRAVENOUS CONTINUOUS PRN
Status: DISCONTINUED | OUTPATIENT
Start: 2023-02-13 | End: 2023-02-13 | Stop reason: SDUPTHER

## 2023-02-13 RX ORDER — ONDANSETRON 2 MG/ML
4 INJECTION INTRAMUSCULAR; INTRAVENOUS
Status: DISCONTINUED | OUTPATIENT
Start: 2023-02-13 | End: 2023-02-13 | Stop reason: HOSPADM

## 2023-02-13 RX ORDER — LABETALOL HYDROCHLORIDE 5 MG/ML
10 INJECTION, SOLUTION INTRAVENOUS
Status: DISCONTINUED | OUTPATIENT
Start: 2023-02-13 | End: 2023-02-13 | Stop reason: HOSPADM

## 2023-02-13 RX ORDER — KETAMINE HYDROCHLORIDE 10 MG/ML
INJECTION INTRAMUSCULAR; INTRAVENOUS PRN
Status: DISCONTINUED | OUTPATIENT
Start: 2023-02-13 | End: 2023-02-13 | Stop reason: SDUPTHER

## 2023-02-13 RX ORDER — SODIUM CHLORIDE, SODIUM LACTATE, POTASSIUM CHLORIDE, CALCIUM CHLORIDE 600; 310; 30; 20 MG/100ML; MG/100ML; MG/100ML; MG/100ML
INJECTION, SOLUTION INTRAVENOUS CONTINUOUS
Status: DISCONTINUED | OUTPATIENT
Start: 2023-02-13 | End: 2023-02-13 | Stop reason: HOSPADM

## 2023-02-13 RX ORDER — LIDOCAINE HYDROCHLORIDE 10 MG/ML
1 INJECTION, SOLUTION EPIDURAL; INFILTRATION; INTRACAUDAL; PERINEURAL
Status: DISCONTINUED | OUTPATIENT
Start: 2023-02-13 | End: 2023-02-13 | Stop reason: HOSPADM

## 2023-02-13 RX ORDER — DEXMEDETOMIDINE HYDROCHLORIDE 100 UG/ML
INJECTION, SOLUTION INTRAVENOUS PRN
Status: DISCONTINUED | OUTPATIENT
Start: 2023-02-13 | End: 2023-02-13 | Stop reason: SDUPTHER

## 2023-02-13 RX ORDER — LIDOCAINE HYDROCHLORIDE 20 MG/ML
INJECTION, SOLUTION EPIDURAL; INFILTRATION; INTRACAUDAL; PERINEURAL PRN
Status: DISCONTINUED | OUTPATIENT
Start: 2023-02-13 | End: 2023-02-13 | Stop reason: SDUPTHER

## 2023-02-13 RX ORDER — TRAMADOL HYDROCHLORIDE 50 MG/1
50-100 TABLET ORAL EVERY 6 HOURS PRN
Qty: 20 TABLET | Refills: 0 | Status: SHIPPED | OUTPATIENT
Start: 2023-02-13 | End: 2023-02-16

## 2023-02-13 RX ORDER — PROPOFOL 10 MG/ML
INJECTION, EMULSION INTRAVENOUS PRN
Status: DISCONTINUED | OUTPATIENT
Start: 2023-02-13 | End: 2023-02-13 | Stop reason: SDUPTHER

## 2023-02-13 RX ORDER — HYDROMORPHONE HCL 110MG/55ML
0.5 PATIENT CONTROLLED ANALGESIA SYRINGE INTRAVENOUS EVERY 5 MIN PRN
Status: DISCONTINUED | OUTPATIENT
Start: 2023-02-13 | End: 2023-02-13 | Stop reason: HOSPADM

## 2023-02-13 RX ORDER — ONDANSETRON 2 MG/ML
INJECTION INTRAMUSCULAR; INTRAVENOUS PRN
Status: DISCONTINUED | OUTPATIENT
Start: 2023-02-13 | End: 2023-02-13 | Stop reason: SDUPTHER

## 2023-02-13 RX ORDER — OXYCODONE HYDROCHLORIDE 5 MG/1
5 TABLET ORAL
Status: DISCONTINUED | OUTPATIENT
Start: 2023-02-13 | End: 2023-02-13 | Stop reason: HOSPADM

## 2023-02-13 RX ORDER — FENTANYL CITRATE 50 UG/ML
INJECTION, SOLUTION INTRAMUSCULAR; INTRAVENOUS PRN
Status: DISCONTINUED | OUTPATIENT
Start: 2023-02-13 | End: 2023-02-13 | Stop reason: SDUPTHER

## 2023-02-13 RX ADMIN — DEXMEDETOMIDINE HYDROCHLORIDE 4 MCG: 100 INJECTION, SOLUTION INTRAVENOUS at 10:03

## 2023-02-13 RX ADMIN — KETAMINE HYDROCHLORIDE 20 MG: 10 INJECTION, SOLUTION INTRAMUSCULAR; INTRAVENOUS at 10:14

## 2023-02-13 RX ADMIN — ONDANSETRON 4 MG: 2 INJECTION INTRAMUSCULAR; INTRAVENOUS at 10:00

## 2023-02-13 RX ADMIN — KETAMINE HYDROCHLORIDE 20 MG: 10 INJECTION, SOLUTION INTRAMUSCULAR; INTRAVENOUS at 09:55

## 2023-02-13 RX ADMIN — KETAMINE HYDROCHLORIDE 10 MG: 10 INJECTION, SOLUTION INTRAMUSCULAR; INTRAVENOUS at 10:32

## 2023-02-13 RX ADMIN — FENTANYL CITRATE 50 MCG: 50 INJECTION, SOLUTION INTRAMUSCULAR; INTRAVENOUS at 09:50

## 2023-02-13 RX ADMIN — SODIUM CHLORIDE: 9 INJECTION, SOLUTION INTRAVENOUS at 10:39

## 2023-02-13 RX ADMIN — DEXMEDETOMIDINE HYDROCHLORIDE 4 MCG: 100 INJECTION, SOLUTION INTRAVENOUS at 10:29

## 2023-02-13 RX ADMIN — DEXAMETHASONE SODIUM PHOSPHATE 8 MG: 4 INJECTION, SOLUTION INTRAMUSCULAR; INTRAVENOUS at 10:00

## 2023-02-13 RX ADMIN — LIDOCAINE HYDROCHLORIDE 100 MG: 20 INJECTION, SOLUTION EPIDURAL; INFILTRATION; INTRACAUDAL; PERINEURAL at 09:50

## 2023-02-13 RX ADMIN — PROPOFOL 200 MG: 10 INJECTION, EMULSION INTRAVENOUS at 09:50

## 2023-02-13 RX ADMIN — SODIUM CHLORIDE: 9 INJECTION, SOLUTION INTRAVENOUS at 09:43

## 2023-02-13 ASSESSMENT — LIFESTYLE VARIABLES: SMOKING_STATUS: 1

## 2023-02-13 ASSESSMENT — PAIN - FUNCTIONAL ASSESSMENT: PAIN_FUNCTIONAL_ASSESSMENT: 0-10

## 2023-02-13 ASSESSMENT — COPD QUESTIONNAIRES: CAT_SEVERITY: MODERATE

## 2023-02-13 NOTE — PROGRESS NOTES
Pt admitted to PACU,  awakening and following commands, BUE with drsngs CD&I, vss, O2 saturations stable on simple mask 6L, RR easy, NSR on tele

## 2023-02-13 NOTE — BRIEF OP NOTE
Brief Postoperative Note      Patient: Hayde Bowie  YOB: 1970  MRN: [id number]    Date of Procedure: 2/13/2023    Pre-Op Diagnosis: Open wound of right upper arm, initial encounter [S41.101A]  Open wound of left upper arm, initial encounter [S41.102A]    Post-Op Diagnosis: Same       Procedure-debridement of skin, SQ and muscle 606 sq cm     Surgeon(s):  Susanna Holland MD    Assistant:  Surgical Assistant: Clem Baez    Anesthesia: General    Estimated Blood Loss (mL): Minimal    Complications: None    Specimens:   * No specimens in log *    Implants:  * No implants in log *      Drains: * No LDAs found *    Findings: R 17x18                  L 15 x 20      Electronically signed by Susanna Holland MD on 2/13/2023 at 10:44 AM

## 2023-02-13 NOTE — PROGRESS NOTES
Pt on unit from pacu report received at bedside. Pt awake denies any pain. VSS dresssing to bilateral arms CDI family at bedside OP notified of pt location for meds to be delivered. call light in reach

## 2023-02-13 NOTE — H&P
Cortez Dunn      HPI: bilateral arm wounds       Past Medical History:   Diagnosis Date    Abscess and cellulitis 1/13/2016    Back pain     Cellulitis of upper extremity     COPD (chronic obstructive pulmonary disease) ([facility])     Insomnia        Past Surgical History:   Procedure Laterality Date    ARM SURGERY Left     due to fracture    BACK SURGERY      FRACTURE SURGERY      skull fracture surgery    LEG SURGERY Right     fracture surgery    OTHER SURGICAL HISTORY      excisional debridement, culture, yimi arms and left foot    PELVIC FRACTURE SURGERY         Social History     Socioeconomic History    Marital status: Single     Spouse name: Not on file    Number of children: Not on file    Years of education: Not on file    Highest education level: Not on file   Occupational History    Not on file   Tobacco Use    Smoking status: Every Day     Packs/day: 1.00     Years: 41.00     Pack years: 41.00     Types: Cigarettes    Smokeless tobacco: Never   Vaping Use    Vaping Use: Never used   Substance and Sexual Activity    Alcohol use: No     Comment: quit 2010    Drug use: Yes     Frequency: 24.0 times per week     Types: Opiates , Other-see comments, IV     Comment: IV HEROIN last used 2/8- snorting    Sexual activity: Not on file   Other Topics Concern    Not on file   Social History Narrative    Not on file     Social Determinants of Health     Financial Resource Strain: Not on file   Food Insecurity: Not on file   Transportation Needs: Not on file   Physical Activity: Not on file   Stress: Not on file   Social Connections: Not on file   Intimate Partner Violence: Not on file   Housing Stability: Not on file       Allergies: No Known Allergies    Prior to Admission medications    Medication Sig Start Date End Date Taking?  Authorizing Provider   mometasone-formoterol (DULERA) 200-5 MCG/ACT inhaler INHALE TWO PUFFS BY MOUTH TWICE A DAY IN THE MORNING AND EVENING 1/26/23   Alysa Luna MD   albuterol sulfate HFA (VENTOLIN HFA) 108 (90 Base) MCG/ACT inhaler Inhale 2 puffs into the lungs 4 times daily as needed for Wheezing 1/26/23   Campbell Thorpe MD   ipratropium-albuterol (DUONEB) 0.5-2.5 (3) MG/3ML SOLN nebulizer solution Inhale 3 mLs into the lungs every 4 hours 1/26/23   Campbell Thorpe MD   Multiple Vitamins-Minerals (THERAPEUTIC MULTIVITAMIN-MINERALS) tablet Take 1 tablet by mouth daily 1/26/23 1/26/24  Campbell Thorpe MD   doxycycline hyclate (VIBRA-TABS) 100 MG tablet Take 1 tablet by mouth 2 times daily for 28 days 1/26/23 2/23/23  Campbell Thorpe MD   QUEtiapine (SEROQUEL) 300 MG tablet Take 1 tablet by mouth at bedtime 1/26/23   Campbell Thorpe MD   QUEtiapine (SEROQUEL) 200 MG tablet TAKE ONE TABLET BY MOUTH EVERY NIGHT AT BEDTIME  Patient not taking: Reported on 2/8/2023 10/6/22   Campbell Thorpe MD       Active Problems:    * No active hospital problems. *  Resolved Problems:    * No resolved hospital problems. *      Blood pressure 118/76, pulse 90, temperature 97.7 °F (36.5 °C), temperature source Temporal, resp. rate 16, height 5' 8\" (1.727 m), weight 168 lb (76.2 kg), SpO2 94 %. Review of Systems    Physical Exam  Cardiovascular:      Rate and Rhythm: Normal rate and regular rhythm. Pulmonary:      Effort: Pulmonary effort is normal.      Breath sounds: Normal breath sounds.        Assessment:  Bilateral arm wounds    Plan:  Debridement of bilateral arm wounds    Jose Agosto MD  2/13/2023

## 2023-02-13 NOTE — DISCHARGE INSTRUCTIONS
[address] Physician Orders and Discharge [facility]  [address] [address]  Telephone: [phone]  [address] 8:00 am - 4:30 pm and Friday 8:00 am - 12:00 pm.          NAME:  Amna Ruvalcaba  YOB: 1970  MEDICAL RECORD NUMBER:  [id number]  DATE:  2/16/2023     Important Reminders:   Please wash hands with soap and water before and after every dressing change. Do not scrub wounds. Keep wounds dry in shower unless otherwise instructed by the physician. SMOKING can slow would healing. Stop smoking as soon as possible to improve healing and prevent further complications associated with smoking. Irina-Wound Topical Treatments:  Do not apply lotions, creams, or ointments to wound bed unless directed. [] Apply moisturizing lotion to skin surrounding the wound prior to dressing change.  [] Apply antifungal ointment to skin surrounding the wound prior to dressing change.  [] Apply thin film of no sting moisture barrier ointment to skin immediately around      wound. [] Other:         Wound Location: RIGHT AND LEFT LOWER ARM WOUNDS     Wound Cleansing:      Primary Dressing:  [x] SANTYL ( APPLY NICKEL THICK TO ENTIRE WOUND ) THEN GAUZE SLIGHTLY DAMPENED WITH SALINE  [x] ADAPTIC     Secondary Dressing:  [x] DRY GAUZE  [x] ROLL GAUZE        Dressing Frequency:  [x] DAILY  [] Do Not Change Dressing                                                   [] Assistive Devices     Use as instructed by the provider        Activity: Activity as Tolerated        Dietary:   Continue your diet as tolerated. Protein is a key nutrient in helping to repair damaged tissue and promote new tissue growth. Good sources of protein include milk, yogurt, cheese, fish, lean meat and beans.   If you are DIABETIC, having diabetes can make it hard for wounds to heal. Try to keep your blood sugar within it's target range. Limit Sodium, Alcohol and Sugar. Pain:   Please Note some pain, drainage and/or bleeding might be expected after seeing the provider. TO HELP ALLEVIATE PAIN WE RECOMMEND THE FOLLOWING  Elevate the affected limb. Use over the counter medications as permitted by your family doctor. For Persistent Pain not relieved by the above interventions, please notify your family doctor. Return Appointment:  [x] Return Appointment: With DR Nicolasa Neely  in 2 Week(s)  [x] Wound and dressing supply provider: [facility]  [] ECF or Home Healthcare:  [] Wound Assessment:         [] Physician or NP scheduled for Wound Assessment:   [] Orders placed during your visit:       : Rio Briceno      Electronically signed by August Matias RN on 2/16/2023 at 3:26 PM           [address] [facility] Information: Should you experience any significant changes in your wound(s) or have questions about your wound care, please contact the [address] [place] [address] at [address] 8:00 am - 4:30 pm and Friday 8:00 am - 12:30 pm.  If you need help with your wound outside these hours and cannot wait until we are again available, contact your PCP or go to the hospital emergency room. PLEASE NOTE: IF YOU ARE UNABLE TO OBTAIN WOUND SUPPLIES, CONTINUE TO USE THE SUPPLIES YOU HAVE AVAILABLE UNTIL YOU ARE ABLE TO REACH US. IT IS MOST IMPORTANT TO KEEP THE WOUND COVERED AT ALL TIMES.      Physician Signature:_______________________     Date: ___________ Time:  ____________                                  Dr Willie Nathan

## 2023-02-13 NOTE — PROGRESS NOTES
Pt off unit via wc by RN. Picked up meds at OP on way out. Wife transporting pt to private residence.

## 2023-02-13 NOTE — DISCHARGE INSTRUCTIONS
Resume previous wound care   Follow up with Dr. Ronda Cabot at [facility] wound center in 3 days      [address] your seatbelt home. You are under the influence of drugs-do not drink alcohol, drive, operate machinery, make any important decisions or sign any legal documents for 24 hours. Children should not ride bikes, Ward or play on gym sets for 24 hours after surgery. A responsible adult needs to be with you for 24 hours. You may experience lightheadedness, dizziness, or sleepiness following surgery. Rest at home today- increase activity as tolerated. Progress slowly to a regular diet unless your physician has instructed you otherwise. Drink plenty of water. If persistent nausea and vomiting becomes a problem, call your physician. Coughing, sore throat and muscle aches are other side effects of anesthesia, and should improve with time. Do not drive or operate machinery while taking narcotics. Females of childbearing potential and on hormonal birth control, should use two forms of contraception following procedure if given a medication called sugammadex and/or emend. Additional contraception should be used for 7 days for sugammadex and/or 28 days for emend. These medications have a potential to reduce the effectiveness of hormonal birth control.

## 2023-02-13 NOTE — ANESTHESIA POSTPROCEDURE EVALUATION
Department of Anesthesiology  Postprocedure Note    Patient: Amna Ruvalcaba  MRN: [id number]  YOB: 1970  Date of evaluation: 2/13/2023      Procedure Summary     Date: 02/13/23 Room / Location: [address]    Anesthesia Start: 5331 Anesthesia Stop: 1100    Procedure: [address] (Bilateral) Diagnosis:       Open wound of right upper arm, initial encounter      Open wound of left upper arm, initial encounter      (Open wound of right upper arm, initial encounter [S41.101A])      (Open wound of left upper arm, initial encounter [S41.102A])    Surgeons: Jose David Garcia MD Responsible Provider: Miryam Madera MD    Anesthesia Type: general ASA Status: 3 - Emergent          Anesthesia Type: No value filed.     Eun Phase I: Eun Score: 10    Eun Phase II:        Anesthesia Post Evaluation    Patient location during evaluation: PACU  Patient participation: complete - patient participated  Level of consciousness: awake and alert  Airway patency: patent  Nausea & Vomiting: no vomiting and no nausea  Complications: no  Cardiovascular status: hemodynamically stable  Respiratory status: acceptable  Hydration status: stable

## 2023-02-13 NOTE — ANESTHESIA PRE PROCEDURE
Department of Anesthesiology  Preprocedure Note       Name:  Milton Duran   Age:  47 y.o.  :  1970                                          MRN:  [id number]         Date:  2023      Surgeon: Carole Carter):  David Wilde MD    Procedure: Procedure(s):  DEBRIDEMENT OF BILATERAL ARM WOUNDS    Medications prior to admission:   Prior to Admission medications    Medication Sig Start Date End Date Taking? Authorizing Provider   mometasone-formoterol (DULERA) 200-5 MCG/ACT inhaler INHALE TWO PUFFS BY MOUTH TWICE A DAY IN THE MORNING AND EVENING 23   Slim Sanabria MD   albuterol sulfate HFA (VENTOLIN HFA) 108 (90 Base) MCG/ACT inhaler Inhale 2 puffs into the lungs 4 times daily as needed for Wheezing 23   Slim Sanabria MD   ipratropium-albuterol (DUONEB) 0.5-2.5 (3) MG/3ML SOLN nebulizer solution Inhale 3 mLs into the lungs every 4 hours 23   Slim Sanabria MD   Multiple Vitamins-Minerals (THERAPEUTIC MULTIVITAMIN-MINERALS) tablet Take 1 tablet by mouth daily 23  Slim Sanabria MD   doxycycline hyclate (VIBRA-TABS) 100 MG tablet Take 1 tablet by mouth 2 times daily for 28 days 23  Slim Sanabria MD   QUEtiapine (SEROQUEL) 300 MG tablet Take 1 tablet by mouth at bedtime 23   Slim Sanabria MD   QUEtiapine (SEROQUEL) 200 MG tablet TAKE ONE TABLET BY MOUTH EVERY NIGHT AT BEDTIME  Patient not taking: Reported on 2023 10/6/22   Slim Sanabria MD       Current medications:    No current facility-administered medications for this encounter.        Allergies:  No Known Allergies    Problem List:    Patient Active Problem List   Diagnosis Code    Abscess and cellulitis L03.90, L02.91    Sepsis affecting skin A41.9    Abscess L02.91    Lactic acid acidosis E87.20    Drug use F19.90    Fungemia B49    Candidemia ([facility] [address] B37.7    Septic phlebitis of upper extremities I80.8    Cellulitis of upper extremity L03.119    Open wound of arm, bilateral S41.101A, S41.102A    Skin abscess L02.91    Drug abuse, IV ([facility]) F19.10    Cutaneous abscess of chest wall L02.213       Past Medical History:        Diagnosis Date    Abscess and cellulitis 1/13/2016    Back pain     Cellulitis of upper extremity     COPD (chronic obstructive pulmonary disease) ([facility])     Insomnia        Past Surgical History:        Procedure Laterality Date    ARM SURGERY Left     due to fracture    BACK SURGERY      FRACTURE SURGERY      skull fracture surgery    LEG SURGERY Right     fracture surgery    OTHER SURGICAL HISTORY      excisional debridement, culture, yimi arms and left foot    PELVIC FRACTURE SURGERY         Social History:    Social History     Tobacco Use    Smoking status: Every Day     Packs/day: 1.00     Years: 41.00     Pack years: 41.00     Types: Cigarettes    Smokeless tobacco: Never   Substance Use Topics    Alcohol use: No     Comment: quit 2010                                Ready to quit: Not Answered  Counseling given: Not Answered      Vital Signs (Current):   Vitals:    02/08/23 1236   Weight: 170 lb (77.1 kg)   Height: 5' 8\" (1.727 m)                                              BP Readings from Last 3 Encounters:   02/09/23 109/69   02/06/23 125/70   02/02/23 125/74       NPO Status:                                                                                 BMI:   Wt Readings from Last 3 Encounters:   02/08/23 170 lb (77.1 kg)   02/02/23 170 lb 6.7 oz (77.3 kg)   01/26/23 167 lb (75.8 kg)     Body mass index is 25.85 kg/m².     CBC:   Lab Results   Component Value Date/Time    WBC 9.2 08/19/2017 06:11 AM    RBC 4.11 08/19/2017 06:11 AM    HGB 11.2 08/19/2017 06:11 AM    HCT 35.0 08/19/2017 06:11 AM    MCV 85.1 08/19/2017 06:11 AM    RDW 16.2 08/19/2017 06:11 AM     08/19/2017 06:11 AM       CMP:   Lab Results   Component Value Date/Time     08/19/2017 06:11 AM    K 5.1 08/19/2017 06:11 AM    CL 97 08/19/2017 06:11 AM    CO2 21 08/19/2017 06:11 AM    BUN 7 08/19/2017 06:11 AM    CREATININE 0.5 08/19/2017 06:11 AM    GFRAA >60 08/19/2017 06:11 AM    GFRAA >60 11/26/2011 05:15 AM    AGRATIO 0.6 08/19/2017 06:11 AM    LABGLOM >60 08/19/2017 06:11 AM    GLUCOSE 98 08/19/2017 06:11 AM    PROT 7.4 08/19/2017 06:11 AM    CALCIUM 8.8 08/19/2017 06:11 AM    BILITOT <0.2 08/19/2017 06:11 AM    ALKPHOS 141 08/19/2017 06:11 AM    AST 13 08/19/2017 06:11 AM    ALT 6 08/19/2017 06:11 AM       POC Tests: No results for input(s): POCGLU, POCNA, POCK, POCCL, POCBUN, POCHEMO, POCHCT in the last 72 hours.     Coags: No results found for: PROTIME, INR, APTT    HCG (If Applicable): No results found for: PREGTESTUR, PREGSERUM, HCG, HCGQUANT     ABGs: No results found for: PHART, PO2ART, ELZ6SWZ, PGA1YXQ, BEART, Z5VXBITK     Type & Screen (If Applicable):  No results found for: LABABO, LABRH    Drug/Infectious Status (If Applicable):  No results found for: HIV, HEPCAB    COVID-19 Screening (If Applicable): No results found for: COVID19        Anesthesia Evaluation  Patient summary reviewed and Nursing notes reviewed no history of anesthetic complications:   Airway: Mallampati: III  TM distance: >3 FB   Neck ROM: full  Mouth opening: > = 3 FB   Dental:    (+) poor dentition      Pulmonary:normal exam  breath sounds clear to auscultation  (+) COPD (daily inh use, no O2 req): moderate,  current smoker    (-) sleep apnea                           Cardiovascular:Negative CV ROS        (-) hypertension, past MI, CAD, CABG/stent, dysrhythmias,  angina and  CHF    ECG reviewed  Rhythm: regular  Rate: normal                    Neuro/Psych:   (+) psychiatric history (IVDA hx, Bipolar; snorts heroin, uses daily, denies any other illicits):depression/anxiety    (-) seizures, TIA and CVA           GI/Hepatic/Renal: Neg GI/Hepatic/Renal ROS       (-) GERD, liver disease and no renal disease       Endo/Other: Negative Endo/Other ROS       (-) diabetes mellitus, hypothyroidism, hyperthyroidism               Abdominal:             Vascular: Other Findings:           Anesthesia Plan      general     ASA 3 - emergent     (Pt understanding of risks of anesthesia w/ active opiate use, surgery must get done given significant infxs, and needs to done to be able to return to methadone clinic, will proceed as emergent case with him understanding and accepting the risks)  Induction: intravenous. MIPS: Postoperative opioids intended and Prophylactic antiemetics administered. Anesthetic plan and risks discussed with patient. Plan discussed with CRNA.                     Mitch Whitt MD   2/13/2023

## 2023-02-13 NOTE — PROGRESS NOTES
Patient is alert and oriented. Denies pain. VSS. Phase 1 recovery completed, will transfer to [facility].

## 2023-02-16 ENCOUNTER — HOSPITAL ENCOUNTER (OUTPATIENT)
Dept: WOUND CARE | Age: 53
Discharge: HOME | End: 2023-02-16
Payer: MEDICAID

## 2023-02-16 VITALS
HEART RATE: 72 BPM | DIASTOLIC BLOOD PRESSURE: 67 MMHG | SYSTOLIC BLOOD PRESSURE: 99 MMHG | TEMPERATURE: 97.5 F | RESPIRATION RATE: 18 BRPM

## 2023-02-16 DIAGNOSIS — L03.119 CELLULITIS OF UPPER EXTREMITY, UNSPECIFIED LATERALITY: ICD-10-CM

## 2023-02-16 DIAGNOSIS — S41.101A OPEN WOUND OF ARM, BILATERAL: Primary | ICD-10-CM

## 2023-02-16 DIAGNOSIS — F19.10 DRUG ABUSE, IV (HCC): ICD-10-CM

## 2023-02-16 DIAGNOSIS — S41.102A OPEN WOUND OF ARM, BILATERAL: Primary | ICD-10-CM

## 2023-02-16 DIAGNOSIS — L02.213 CUTANEOUS ABSCESS OF CHEST WALL: ICD-10-CM

## 2023-02-16 PROCEDURE — 11045 DBRDMT SUBQ TISS EACH ADDL: CPT | Performed by: SURGERY

## 2023-02-16 PROCEDURE — 11042 DBRDMT SUBQ TIS 1ST 20SQCM/<: CPT | Performed by: SURGERY

## 2023-02-16 PROCEDURE — 11045 DBRDMT SUBQ TISS EACH ADDL: CPT

## 2023-02-16 PROCEDURE — 11042 DBRDMT SUBQ TIS 1ST 20SQCM/<: CPT

## 2023-02-16 RX ORDER — BETAMETHASONE DIPROPIONATE 0.05 %
OINTMENT (GRAM) TOPICAL ONCE
OUTPATIENT
Start: 2023-02-16 | End: 2023-02-16

## 2023-02-16 RX ORDER — LIDOCAINE 40 MG/G
CREAM TOPICAL ONCE
OUTPATIENT
Start: 2023-02-16 | End: 2023-02-16

## 2023-02-16 RX ORDER — LIDOCAINE HYDROCHLORIDE 20 MG/ML
JELLY TOPICAL ONCE
OUTPATIENT
Start: 2023-02-16 | End: 2023-02-16

## 2023-02-16 RX ORDER — CLOBETASOL PROPIONATE 0.5 MG/G
OINTMENT TOPICAL ONCE
OUTPATIENT
Start: 2023-02-16 | End: 2023-02-16

## 2023-02-16 RX ORDER — LIDOCAINE HYDROCHLORIDE 40 MG/ML
SOLUTION TOPICAL ONCE
OUTPATIENT
Start: 2023-02-16 | End: 2023-02-16

## 2023-02-16 RX ORDER — BACITRACIN ZINC AND POLYMYXIN B SULFATE 500; 1000 [USP'U]/G; [USP'U]/G
OINTMENT TOPICAL ONCE
OUTPATIENT
Start: 2023-02-16 | End: 2023-02-16

## 2023-02-16 RX ORDER — BACITRACIN, NEOMYCIN, POLYMYXIN B 400; 3.5; 5 [USP'U]/G; MG/G; [USP'U]/G
OINTMENT TOPICAL ONCE
OUTPATIENT
Start: 2023-02-16 | End: 2023-02-16

## 2023-02-16 RX ORDER — GINSENG 100 MG
CAPSULE ORAL ONCE
OUTPATIENT
Start: 2023-02-16 | End: 2023-02-16

## 2023-02-16 RX ORDER — LIDOCAINE 50 MG/G
OINTMENT TOPICAL ONCE
OUTPATIENT
Start: 2023-02-16 | End: 2023-02-16

## 2023-02-16 RX ORDER — GENTAMICIN SULFATE 1 MG/G
OINTMENT TOPICAL ONCE
OUTPATIENT
Start: 2023-02-16 | End: 2023-02-16

## 2023-02-16 RX ORDER — LIDOCAINE HYDROCHLORIDE 40 MG/ML
SOLUTION TOPICAL ONCE
Status: COMPLETED | OUTPATIENT
Start: 2023-02-16 | End: 2023-02-16

## 2023-02-16 RX ADMIN — LIDOCAINE HYDROCHLORIDE: 40 SOLUTION TOPICAL at 14:52

## 2023-02-16 ASSESSMENT — PAIN SCALES - GENERAL
PAINLEVEL_OUTOF10: 4
PAINLEVEL_OUTOF10: 8

## 2023-02-16 ASSESSMENT — PAIN DESCRIPTION - LOCATION
LOCATION: ARM
LOCATION: ARM

## 2023-02-16 ASSESSMENT — PAIN DESCRIPTION - ONSET
ONSET: ON-GOING
ONSET: ON-GOING

## 2023-02-16 ASSESSMENT — PAIN DESCRIPTION - PAIN TYPE
TYPE: ACUTE PAIN
TYPE: ACUTE PAIN

## 2023-02-16 ASSESSMENT — PAIN DESCRIPTION - FREQUENCY
FREQUENCY: CONTINUOUS
FREQUENCY: INTERMITTENT

## 2023-02-16 ASSESSMENT — PAIN DESCRIPTION - ORIENTATION
ORIENTATION: RIGHT;LEFT
ORIENTATION: RIGHT;LEFT

## 2023-02-16 ASSESSMENT — PAIN DESCRIPTION - DESCRIPTORS
DESCRIPTORS: BURNING
DESCRIPTORS: BURNING
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
## 2023-02-16 NOTE — OP NOTE
[facility]                     [facility], [address] [place] [address]                                OPERATIVE REPORT    PATIENT NAME: Felipe Fry                  :        1970  MED REC NO:   [id number]                          ROOM:  ACCOUNT NO:   [de-identified]                           ADMIT DATE: 2023  PROVIDER:     Claudette Erb, MD    DATE OF PROCEDURE:  2023    PREOPERATIVE DIAGNOSIS:  Extensive bilateral arm wounds. POSTOPERATIVE DIAGNOSIS:  Extensive bilateral arm wounds. PROCEDURE:  Excisional debridement of skin, subcutaneous tissue as well  as muscle of the bilateral forearms. The total surface area of  debridement was 606 sq. cm.    SURGEON:  Claudette Erb, MD    ANESTHESIA:  General.    ESTIMATED BLOOD LOSS:  Minimal.    COMPLICATIONS:  None. SPECIMEN:  None. OPERATIVE INDICATION AND CONSENT:  The patient is a 71-year-old male, IV  drug abuser with massive wounds of his bilateral forearms. He is  brought in today for debridement. He was explained the risks, benefits  and possible complications. DETAILS OF THE PROCEDURE:  The patient was brought to the operative  suite and placed in the supine position on the operating table. After  general anesthetic, both arms were extended at 90 degrees on the table. They were then prepped and draped in usual sterile fashion. We began on the right side. The pre-debridement measurement was 15 x 18  x 1.2 cm. We removed the largest areas of thick necrotic debris with  either a 15-blade knife or cautery. There were some areas of necrotic  muscle which were debrided with cautery. Lastly, there were areas of  tendon which were partially debrided. Once the larger areas of necrotic  tissue were debrided, the entire wound was debrided with a curette. Hemostasis was achieved with pressure. The post-debridement measurement  was 17 x 18 x 1.2 cm.     We then turned our attention to the left side. This was debrided in a  similar fashion. The pre-debridement measurement was 12 x 18 x 1 cm. We removed all the thick areas of necrotic tissue using cautery ora a  15-blade knife. There was also some necrotic muscle which required  excision. Once the larger areas of necrotic tissue were debrided, the  entire wound was debrided with a curette. The post-debridement  measurement was 15 x 20 x 1.2 cm. Again, hemostasis was achieved with  pressure. There was one area which was particularly tender to the  patient preoperatively. This area was injected with 0.5% Marcaine. The arm wounds were dressed with dampened saline gauze, followed by  Kerlix and Ace wraps. He tolerated the procedure without difficulty and  was transferred to recovery room in stable condition. Ila Dallas.  Tim Clifford MD    D: 02/15/2023 11:08:58       T: 02/15/2023 11:12:41     [email or web address]  Job#: [id number]     Doc#: [id number]    CC:  Willie Nathan MD

## 2023-02-24 NOTE — DISCHARGE INSTRUCTIONS
[address] Physician Orders and Discharge [address]  [address], [address] [address]  Telephone: [phone]  [address] 8:00 am - 4:30 pm and Friday 8:00 am - 12:00 pm.          NAME:  Emilie Arevalo  YOB: 1970  MEDICAL RECORD NUMBER:  [id number]  DATE:  3/2/2023     Important Reminders:   Please wash hands with soap and water before and after every dressing change. Do not scrub wounds. Keep wounds dry in shower unless otherwise instructed by the physician. SMOKING can slow would healing. Stop smoking as soon as possible to improve healing and prevent further complications associated with smoking. Irina-Wound Topical Treatments:  Do not apply lotions, creams, or ointments to wound bed unless directed. [] Apply moisturizing lotion to skin surrounding the wound prior to dressing change.  [] Apply antifungal ointment to skin surrounding the wound prior to dressing change.  [] Apply thin film of no sting moisture barrier ointment to skin immediately around      wound. [] Other:         Wound Location: RIGHT AND LEFT LOWER ARM WOUNDS     Wound Cleansing:      Primary Dressing:  [x] SANTYL ( APPLY NICKEL THICK TO ENTIRE WOUND )   [x] ADAPTIC     Secondary Dressing:  [x] DRY GAUZE (ABD PADS)  [x] ROLL GAUZE        Dressing Frequency:  [x] DAILY  (MAY SOAK DRESSING WITH SALINE TO REMOVE DRESSING IF ADAPTIC STICKS TO WOUND )  [] Do Not Change Dressing                                                   [] Assistive Devices     Use as instructed by the provider        Activity: Activity as Tolerated        Dietary:   Continue your diet as tolerated. Protein is a key nutrient in helping to repair damaged tissue and promote new tissue growth. Good sources of protein include milk, yogurt, cheese, fish, lean meat and beans.   If you are DIABETIC, having diabetes can make it hard for wounds to heal. Try to keep your blood sugar within it's target range. Limit Sodium, Alcohol and Sugar. Pain:   Please Note some pain, drainage and/or bleeding might be expected after seeing the provider. TO HELP ALLEVIATE PAIN WE RECOMMEND THE FOLLOWING  Elevate the affected limb. Use over the counter medications as permitted by your family doctor. For Persistent Pain not relieved by the above interventions, please notify your family doctor. Return Appointment:  [x] Return Appointment: With DR Russell Escobar  in 2 Week(s)  [x] Wound and dressing supply provider: [facility]  [] ECF or Home Healthcare:  [] Wound Assessment:         [] Physician or NP scheduled for Wound Assessment:   [] Orders placed during your visit:        : Esau Sheppard      Electronically signed by Anuel Blount RN on 3/2/2023 at 4:23 PM                [facility] Information: Should you experience any significant changes in your wound(s) or have questions about your wound care, please contact the [address] [place] [address] at [address] 8:00 am - 4:30 pm and Friday 8:00 am - 12:30 pm.  If you need help with your wound outside these hours and cannot wait until we are again available, contact your PCP or go to the hospital emergency room. PLEASE NOTE: IF YOU ARE UNABLE TO OBTAIN WOUND SUPPLIES, CONTINUE TO USE THE SUPPLIES YOU HAVE AVAILABLE UNTIL YOU ARE ABLE TO REACH US. IT IS MOST IMPORTANT TO KEEP THE WOUND COVERED AT ALL TIMES.      Physician Signature:_______________________     Date: ___________ Time:  ____________                                  Dr Norma Sosa

## 2023-03-02 ENCOUNTER — HOSPITAL ENCOUNTER (OUTPATIENT)
Dept: WOUND CARE | Age: 53
Discharge: HOME | End: 2023-03-02
Payer: MEDICAID

## 2023-03-02 VITALS — TEMPERATURE: 97.5 F | RESPIRATION RATE: 18 BRPM

## 2023-03-02 DIAGNOSIS — S41.101A OPEN WOUND OF ARM, BILATERAL: Primary | ICD-10-CM

## 2023-03-02 DIAGNOSIS — L03.113 CELLULITIS OF RIGHT UPPER EXTREMITY: ICD-10-CM

## 2023-03-02 DIAGNOSIS — F19.10 DRUG ABUSE, IV (HCC): ICD-10-CM

## 2023-03-02 DIAGNOSIS — S41.102A OPEN WOUND OF ARM, BILATERAL: Primary | ICD-10-CM

## 2023-03-02 DIAGNOSIS — L03.119 CELLULITIS OF UPPER EXTREMITY, UNSPECIFIED LATERALITY: ICD-10-CM

## 2023-03-02 DIAGNOSIS — L02.213 CUTANEOUS ABSCESS OF CHEST WALL: ICD-10-CM

## 2023-03-02 PROCEDURE — 11045 DBRDMT SUBQ TISS EACH ADDL: CPT

## 2023-03-02 PROCEDURE — 11045 DBRDMT SUBQ TISS EACH ADDL: CPT | Performed by: SURGERY

## 2023-03-02 PROCEDURE — 11042 DBRDMT SUBQ TIS 1ST 20SQCM/<: CPT

## 2023-03-02 PROCEDURE — 11042 DBRDMT SUBQ TIS 1ST 20SQCM/<: CPT | Performed by: SURGERY

## 2023-03-02 RX ORDER — LIDOCAINE HYDROCHLORIDE 40 MG/ML
SOLUTION TOPICAL ONCE
OUTPATIENT
Start: 2023-03-02 | End: 2023-03-02

## 2023-03-02 RX ORDER — GENTAMICIN SULFATE 1 MG/G
OINTMENT TOPICAL ONCE
OUTPATIENT
Start: 2023-03-02 | End: 2023-03-02

## 2023-03-02 RX ORDER — BACITRACIN ZINC AND POLYMYXIN B SULFATE 500; 1000 [USP'U]/G; [USP'U]/G
OINTMENT TOPICAL ONCE
OUTPATIENT
Start: 2023-03-02 | End: 2023-03-02

## 2023-03-02 RX ORDER — CLOBETASOL PROPIONATE 0.5 MG/G
OINTMENT TOPICAL ONCE
OUTPATIENT
Start: 2023-03-02 | End: 2023-03-02

## 2023-03-02 RX ORDER — LIDOCAINE HYDROCHLORIDE 20 MG/ML
JELLY TOPICAL ONCE
OUTPATIENT
Start: 2023-03-02 | End: 2023-03-02

## 2023-03-02 RX ORDER — GINSENG 100 MG
CAPSULE ORAL ONCE
OUTPATIENT
Start: 2023-03-02 | End: 2023-03-02

## 2023-03-02 RX ORDER — LIDOCAINE 50 MG/G
OINTMENT TOPICAL ONCE
OUTPATIENT
Start: 2023-03-02 | End: 2023-03-02

## 2023-03-02 RX ORDER — BETAMETHASONE DIPROPIONATE 0.05 %
OINTMENT (GRAM) TOPICAL ONCE
OUTPATIENT
Start: 2023-03-02 | End: 2023-03-02

## 2023-03-02 RX ORDER — LIDOCAINE HYDROCHLORIDE 40 MG/ML
SOLUTION TOPICAL ONCE
Status: COMPLETED | OUTPATIENT
Start: 2023-03-02 | End: 2023-03-02

## 2023-03-02 RX ORDER — BACITRACIN, NEOMYCIN, POLYMYXIN B 400; 3.5; 5 [USP'U]/G; MG/G; [USP'U]/G
OINTMENT TOPICAL ONCE
OUTPATIENT
Start: 2023-03-02 | End: 2023-03-02

## 2023-03-02 RX ORDER — LIDOCAINE 40 MG/G
CREAM TOPICAL ONCE
OUTPATIENT
Start: 2023-03-02 | End: 2023-03-02

## 2023-03-02 RX ADMIN — LIDOCAINE HYDROCHLORIDE 30 ML: 40 SOLUTION TOPICAL at 15:45

## 2023-03-02 ASSESSMENT — PAIN DESCRIPTION - FREQUENCY
FREQUENCY: CONTINUOUS
FREQUENCY: CONTINUOUS

## 2023-03-02 ASSESSMENT — PAIN DESCRIPTION - ONSET
ONSET: ON-GOING
ONSET: ON-GOING

## 2023-03-02 ASSESSMENT — PAIN DESCRIPTION - DESCRIPTORS
DESCRIPTORS: CRAMPING
DESCRIPTORS: BURNING

## 2023-03-02 ASSESSMENT — PAIN SCALES - GENERAL
PAINLEVEL_OUTOF10: 3
PAINLEVEL_OUTOF10: 6

## 2023-03-02 ASSESSMENT — PAIN DESCRIPTION - PAIN TYPE
TYPE: ACUTE PAIN
TYPE: ACUTE PAIN;CHRONIC PAIN

## 2023-03-02 ASSESSMENT — PAIN DESCRIPTION - LOCATION
LOCATION: ARM
LOCATION: ARM

## 2023-03-02 ASSESSMENT — PAIN DESCRIPTION - ORIENTATION
ORIENTATION: RIGHT;LEFT
ORIENTATION: LEFT
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
## 2023-03-10 NOTE — DISCHARGE INSTRUCTIONS
[facility] Wound Care Physician Orders and Discharge Instructions  [facility]  [address] [facility] [address], [address]  [place], [place] [address]  Telephone: [phone]      FAX [phone]  MONDAY - THURSDAY 8:00 am - 4:30 pm and Friday 8:00 am - 12:00 pm.          NAME:  Augustus Dai  YOB: 1970  MEDICAL RECORD NUMBER:  [id number]  DATE:  3/16/2023     Important Reminders:   Please wash hands with soap and water before and after every dressing change.  Do not scrub wounds.  Keep wounds dry in shower unless otherwise instructed by the physician.  SMOKING can slow would healing. Stop smoking as soon as possible to improve healing and prevent further complications associated with smoking.        Irina-Wound Topical Treatments:  Do not apply lotions, creams, or ointments to wound bed unless directed.   [] Apply moisturizing lotion to skin surrounding the wound prior to dressing change.  [] Apply antifungal ointment to skin surrounding the wound prior to dressing change.  [] Apply thin film of no sting moisture barrier ointment to skin immediately around      wound.  [] Other:         Wound Location: RIGHT AND LEFT LOWER ARM WOUNDS     Wound Cleansing:      Primary Dressing:  [x] HYDROFERA BLUR TRANSFER  []      Secondary Dressing:  [x] DRY GAUZE (ABD PADS)  [x] ROLL GAUZE        COBAN APPLIED LIGHTLY TO KEEP DRESSING IN PLACE        Dressing Frequency:  [x] THREE TIMES PER WEEK  (MAY SOAK DRESSING WITH SALINE TO REMOVE DRESSING IF NEEDED  )  [] Do Not Change Dressing                                                   [] Assistive Devices     Use as instructed by the provider        Activity: Activity as Tolerated        Dietary:   Continue your diet as tolerated.  Protein is a key nutrient in helping to repair damaged tissue and promote new tissue growth. Good sources of protein include milk, yogurt, cheese, fish, lean meat and beans.  If you are DIABETIC, having diabetes can  make it hard for wounds to heal. Try to keep your blood sugar within it's target range. Limit Sodium, Alcohol and Sugar. Pain:   Please Note some pain, drainage and/or bleeding might be expected after seeing the provider. TO HELP ALLEVIATE PAIN WE RECOMMEND THE FOLLOWING  Elevate the affected limb. Use over the counter medications as permitted by your family doctor. For Persistent Pain not relieved by the above interventions, please notify your family doctor. Return Appointment:  [x] Return Appointment: With DR Kali Bowen  in 2 Week(s)  [x] Wound and dressing supply provider: [facility]  [] ECF or Home Healthcare:  [] Wound Assessment:         [] Physician or NP scheduled for Wound Assessment:   [] Orders placed during your visit:        : Lydia Freeman      Electronically signed by Brigitte Lawton RN on 3/16/2023 at 3:33 PM        [facility] Information: Should you experience any significant changes in your wound(s) or have questions about your wound care, please contact the [address] [place] [address] at [address] 8:00 am - 4:30 pm and Friday 8:00 am - 12:30 pm.  If you need help with your wound outside these hours and cannot wait until we are again available, contact your PCP or go to the hospital emergency room. PLEASE NOTE: IF YOU ARE UNABLE TO OBTAIN WOUND SUPPLIES, CONTINUE TO USE THE SUPPLIES YOU HAVE AVAILABLE UNTIL YOU ARE ABLE TO REACH US. IT IS MOST IMPORTANT TO KEEP THE WOUND COVERED AT ALL TIMES.      Physician Signature:_______________________     Date: ___________ Time:  ____________                                  Dr Rissa Sanchez

## 2023-03-16 ENCOUNTER — HOSPITAL ENCOUNTER (OUTPATIENT)
Dept: WOUND CARE | Age: 53
Discharge: HOME | End: 2023-03-16
Payer: MEDICAID

## 2023-03-16 VITALS
TEMPERATURE: 98.4 F | HEART RATE: 95 BPM | DIASTOLIC BLOOD PRESSURE: 82 MMHG | RESPIRATION RATE: 18 BRPM | SYSTOLIC BLOOD PRESSURE: 127 MMHG

## 2023-03-16 DIAGNOSIS — S41.101A OPEN WOUND OF ARM, BILATERAL: ICD-10-CM

## 2023-03-16 DIAGNOSIS — L03.119 CELLULITIS OF UPPER EXTREMITY, UNSPECIFIED LATERALITY: ICD-10-CM

## 2023-03-16 DIAGNOSIS — F19.10 DRUG ABUSE, IV (HCC): ICD-10-CM

## 2023-03-16 DIAGNOSIS — L02.213 CUTANEOUS ABSCESS OF CHEST WALL: Primary | ICD-10-CM

## 2023-03-16 DIAGNOSIS — S41.102A OPEN WOUND OF ARM, BILATERAL: ICD-10-CM

## 2023-03-16 PROCEDURE — 11045 DBRDMT SUBQ TISS EACH ADDL: CPT

## 2023-03-16 PROCEDURE — 11042 DBRDMT SUBQ TIS 1ST 20SQCM/<: CPT | Performed by: SURGERY

## 2023-03-16 PROCEDURE — 11045 DBRDMT SUBQ TISS EACH ADDL: CPT | Performed by: SURGERY

## 2023-03-16 PROCEDURE — 11042 DBRDMT SUBQ TIS 1ST 20SQCM/<: CPT

## 2023-03-16 RX ORDER — BACITRACIN, NEOMYCIN, POLYMYXIN B 400; 3.5; 5 [USP'U]/G; MG/G; [USP'U]/G
OINTMENT TOPICAL ONCE
OUTPATIENT
Start: 2023-03-16 | End: 2023-03-16

## 2023-03-16 RX ORDER — GENTAMICIN SULFATE 1 MG/G
OINTMENT TOPICAL ONCE
OUTPATIENT
Start: 2023-03-16 | End: 2023-03-16

## 2023-03-16 RX ORDER — LIDOCAINE 40 MG/G
CREAM TOPICAL ONCE
OUTPATIENT
Start: 2023-03-16 | End: 2023-03-16

## 2023-03-16 RX ORDER — LIDOCAINE HYDROCHLORIDE 40 MG/ML
SOLUTION TOPICAL ONCE
Status: COMPLETED | OUTPATIENT
Start: 2023-03-16 | End: 2023-03-16

## 2023-03-16 RX ORDER — LIDOCAINE HYDROCHLORIDE 20 MG/ML
JELLY TOPICAL ONCE
OUTPATIENT
Start: 2023-03-16 | End: 2023-03-16

## 2023-03-16 RX ORDER — BETAMETHASONE DIPROPIONATE 0.05 %
OINTMENT (GRAM) TOPICAL ONCE
OUTPATIENT
Start: 2023-03-16 | End: 2023-03-16

## 2023-03-16 RX ORDER — LIDOCAINE HYDROCHLORIDE 40 MG/ML
SOLUTION TOPICAL ONCE
OUTPATIENT
Start: 2023-03-16 | End: 2023-03-16

## 2023-03-16 RX ORDER — CLOBETASOL PROPIONATE 0.5 MG/G
OINTMENT TOPICAL ONCE
OUTPATIENT
Start: 2023-03-16 | End: 2023-03-16

## 2023-03-16 RX ORDER — GINSENG 100 MG
CAPSULE ORAL ONCE
OUTPATIENT
Start: 2023-03-16 | End: 2023-03-16

## 2023-03-16 RX ORDER — BACITRACIN ZINC AND POLYMYXIN B SULFATE 500; 1000 [USP'U]/G; [USP'U]/G
OINTMENT TOPICAL ONCE
OUTPATIENT
Start: 2023-03-16 | End: 2023-03-16

## 2023-03-16 RX ORDER — LIDOCAINE 50 MG/G
OINTMENT TOPICAL ONCE
OUTPATIENT
Start: 2023-03-16 | End: 2023-03-16

## 2023-03-16 RX ADMIN — LIDOCAINE HYDROCHLORIDE 10 ML: 40 SOLUTION TOPICAL at 15:13

## 2023-03-16 ASSESSMENT — PAIN - FUNCTIONAL ASSESSMENT: PAIN_FUNCTIONAL_ASSESSMENT: PREVENTS OR INTERFERES SOME ACTIVE ACTIVITIES AND ADLS

## 2023-03-16 ASSESSMENT — PAIN DESCRIPTION - LOCATION: LOCATION: ARM

## 2023-03-16 ASSESSMENT — PAIN SCALES - GENERAL
PAINLEVEL_OUTOF10: 4
PAINLEVEL_OUTOF10: 0

## 2023-03-16 ASSESSMENT — PAIN DESCRIPTION - ONSET: ONSET: ON-GOING

## 2023-03-16 ASSESSMENT — PAIN DESCRIPTION - PAIN TYPE: TYPE: ACUTE PAIN;CHRONIC PAIN

## 2023-03-16 ASSESSMENT — PAIN DESCRIPTION - DESCRIPTORS: DESCRIPTORS: ACHING

## 2023-03-16 ASSESSMENT — PAIN DESCRIPTION - FREQUENCY: FREQUENCY: CONTINUOUS

## 2023-03-16 ASSESSMENT — PAIN DESCRIPTION - ORIENTATION: ORIENTATION: RIGHT;LEFT
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
## 2023-03-16 NOTE — PLAN OF CARE
[address]:     [facility] [address] [facility] , [address]  J:[phone] f: [phone]     [place]:      [address]  [address]  [place] [address]  [phone]  WOUND CARE Dept: [phone]   [address] [place] [address] Geovani NUMBER [unfilled]    Patient Information:      Radha Charles  [address] [facility]   [phone]   : 1970  AGE: 47 y.o. GENDER: male   TODAYS DATE:  3/16/2023    Insurance:      PRIMARY INSURANCE:  Plan: [address] [place] [address] DEPT OF JOB  Coverage: MEDICAID [place]  Effective Date: 2022  [id number] - (Medicaid)    SECONDARY INSURANCE:  Plan:   Coverage:   Effective Date:   [unfilled]    [unfilled]   [unfilled]     Patient Wound Information:      Problem List Items Addressed This Visit          Other    Drug abuse, IV ([address]    Relevant Orders    Initiate Outpatient Wound Care Protocol    Cutaneous abscess of chest wall - Primary    Relevant Orders    Initiate Outpatient Wound Care Protocol    Cellulitis of upper extremity    Relevant Orders    Initiate Outpatient Wound Care Protocol    Open wound of arm, bilateral    Relevant Orders    Initiate Outpatient Wound Care Protocol     ICD-10 codes:  I05.136Y ; D00.805Q    WOUNDS REQUIRING DRESSING SUPPLIES:     Wound 23 Arm Left #1 (Noted 2022) (Active)   Wound Image    23 1545   Dressing Status Old drainage noted 23 1440   Wound Cleansed Cleansed with saline 23 1440   Dressing/Treatment Pharmaceutical agent (see MAR);ABD;Roll gauze; Other (comment) 23 1657   Wound Length (cm) 15 cm 23 1508   Wound Width (cm) 24.5 cm 23 1508   Wound Depth (cm) 0.1 cm 23 1508   Wound Surface Area (cm^2) 367.5 cm^2 23 1508   Change in Wound Size % (l*w) 27.59 23 1508   Wound Volume (cm^3) 36.75 cm^3 23 1508   Wound Healing % 93 23 1508   Post-Procedure Length (cm) 15.1 cm 03/16/23 1530   Post-Procedure Width (cm) 24.6 cm 03/16/23 1530   Post-Procedure Depth (cm) 0.1 cm 03/16/23 1530   Post-Procedure Surface Area (cm^2) 371.46 cm^2 03/16/23 1530   Post-Procedure Volume (cm^3) 37.146 cm^3 03/16/23 1530   Wound Assessment Granulation tissue;Slough; Hyper granulation tissue 03/16/23 1508   Drainage Amount Moderate 03/16/23 1508   Drainage Description Serosanguinous;Green 03/16/23 1508   Odor Moderate 03/16/23 1508   Irina-wound Assessment Dry/flaky; Blanchable erythema 03/16/23 1508   Margins Undefined edges 03/16/23 1508   Wound Thickness Description not for Pressure Injury Full thickness 03/16/23 1508   Number of days: 41       Wound 02/02/23 Arm Right #2 (Noted 2/2022) (Active)   Wound Image    03/02/23 1545   Dressing Status Old drainage noted 02/16/23 1440   Wound Cleansed Cleansed with saline 02/16/23 1440   Dressing/Treatment Pharmaceutical agent (see MAR);ABD;Roll gauze; Other (comment) 03/02/23 1657   Wound Length (cm) 19.5 cm 03/16/23 1508   Wound Width (cm) 19 cm 03/16/23 1508   Wound Depth (cm) 0.3 cm 03/16/23 1508   Wound Surface Area (cm^2) 370.5 cm^2 03/16/23 1508   Change in Wound Size % (l*w) 35 03/16/23 1508   Wound Volume (cm^3) 111.15 cm^3 03/16/23 1508   Wound Healing % 76 03/16/23 1508   Post-Procedure Length (cm) 19.6 cm 03/16/23 1530   Post-Procedure Width (cm) 19.1 cm 03/16/23 1530   Post-Procedure Depth (cm) 0.3 cm 03/16/23 1530   Post-Procedure Surface Area (cm^2) 374.36 cm^2 03/16/23 1530   Post-Procedure Volume (cm^3) 112.308 cm^3 03/16/23 1530   Wound Assessment Granulation tissue;Slough; Exposed structure tendon; Hyper granulation tissue 03/16/23 1508   Drainage Amount Moderate 03/16/23 1508   Drainage Description Serosanguinous;Green 03/16/23 1508   Odor Moderate 03/16/23 1508   Irina-wound Assessment Blanchable erythema;Dry/flaky 03/16/23 1508   Margins Undefined edges 03/16/23 1508   Wound Thickness Description not for Pressure Injury Full thickness 03/02/23 1545   Number of days: 41       Wound 02/13/23 (Active)   Number of days: 31          Supplies Requested :           **DISPENSE AS WRITTEN**    WOUND #: 1 and 2   PRIMARY DRESSING:  Other: HYDROFERA BLUE TRANSFER   Cover and Secure with: ABD pad  Bulky roll gauze  Other 6\" COBAN     FREQUENCY OF DRESSING CHANGES:  Three times per week           ADDITIONAL ITEMS:  [] Gloves Small  [] Gloves Medium [] Gloves Large [] Gloves XLarge  [x] Tape 1\" [] Tape 2\" [] Tape 3\"  [] Medipore Tape  [x] Saline  [] Skin Prep   [] Adhesive Remover   [] Cotton Tip Applicators   [] Other:    Patient Wound(s) Debrided: [x] Yes   [] No    Debribement Type: subcutaneous tissue    Debridement Date: 3/16/2023    Patient currently being seen by Home Health: [] Yes   [x] No    Duration for needed supplies:  []15  []30  []60  []90 Days    Provider Information:      PROVIDER'S NAME: MD Elizabeth Webster NPI: [id number]

## 2023-04-06 ENCOUNTER — HOSPITAL ENCOUNTER (OUTPATIENT)
Dept: WOUND CARE | Age: 53
Discharge: HOME | End: 2023-04-06
Payer: MEDICAID

## 2023-04-06 VITALS
DIASTOLIC BLOOD PRESSURE: 77 MMHG | RESPIRATION RATE: 18 BRPM | TEMPERATURE: 98.2 F | HEART RATE: 69 BPM | SYSTOLIC BLOOD PRESSURE: 118 MMHG

## 2023-04-06 DIAGNOSIS — S41.102A OPEN WOUND OF ARM, BILATERAL: Primary | ICD-10-CM

## 2023-04-06 DIAGNOSIS — L02.213 CUTANEOUS ABSCESS OF CHEST WALL: ICD-10-CM

## 2023-04-06 DIAGNOSIS — S41.101A OPEN WOUND OF ARM, BILATERAL: Primary | ICD-10-CM

## 2023-04-06 DIAGNOSIS — F19.10 DRUG ABUSE, IV (HCC): ICD-10-CM

## 2023-04-06 DIAGNOSIS — L03.119 CELLULITIS OF UPPER EXTREMITY, UNSPECIFIED LATERALITY: ICD-10-CM

## 2023-04-06 PROCEDURE — 11045 DBRDMT SUBQ TISS EACH ADDL: CPT

## 2023-04-06 PROCEDURE — 11042 DBRDMT SUBQ TIS 1ST 20SQCM/<: CPT

## 2023-04-06 RX ORDER — LIDOCAINE 50 MG/G
OINTMENT TOPICAL ONCE
OUTPATIENT
Start: 2023-04-06 | End: 2023-04-06

## 2023-04-06 RX ORDER — LIDOCAINE HYDROCHLORIDE 20 MG/ML
JELLY TOPICAL ONCE
OUTPATIENT
Start: 2023-04-06 | End: 2023-04-06

## 2023-04-06 RX ORDER — BETAMETHASONE DIPROPIONATE 0.05 %
OINTMENT (GRAM) TOPICAL ONCE
OUTPATIENT
Start: 2023-04-06 | End: 2023-04-06

## 2023-04-06 RX ORDER — LIDOCAINE HYDROCHLORIDE 40 MG/ML
SOLUTION TOPICAL ONCE
OUTPATIENT
Start: 2023-04-06 | End: 2023-04-06

## 2023-04-06 RX ORDER — GENTAMICIN SULFATE 1 MG/G
OINTMENT TOPICAL ONCE
OUTPATIENT
Start: 2023-04-06 | End: 2023-04-06

## 2023-04-06 RX ORDER — LIDOCAINE 40 MG/G
CREAM TOPICAL ONCE
OUTPATIENT
Start: 2023-04-06 | End: 2023-04-06

## 2023-04-06 RX ORDER — CLOBETASOL PROPIONATE 0.5 MG/G
OINTMENT TOPICAL ONCE
OUTPATIENT
Start: 2023-04-06 | End: 2023-04-06

## 2023-04-06 RX ORDER — LIDOCAINE HYDROCHLORIDE 40 MG/ML
SOLUTION TOPICAL ONCE
Status: COMPLETED | OUTPATIENT
Start: 2023-04-06 | End: 2023-04-06

## 2023-04-06 RX ORDER — BACITRACIN ZINC AND POLYMYXIN B SULFATE 500; 1000 [USP'U]/G; [USP'U]/G
OINTMENT TOPICAL ONCE
OUTPATIENT
Start: 2023-04-06 | End: 2023-04-06

## 2023-04-06 RX ORDER — BACITRACIN, NEOMYCIN, POLYMYXIN B 400; 3.5; 5 [USP'U]/G; MG/G; [USP'U]/G
OINTMENT TOPICAL ONCE
OUTPATIENT
Start: 2023-04-06 | End: 2023-04-06

## 2023-04-06 RX ORDER — GINSENG 100 MG
CAPSULE ORAL ONCE
OUTPATIENT
Start: 2023-04-06 | End: 2023-04-06

## 2023-04-06 RX ADMIN — LIDOCAINE HYDROCHLORIDE: 40 SOLUTION TOPICAL at 15:19

## 2023-04-06 ASSESSMENT — PAIN SCALES - GENERAL: PAINLEVEL_OUTOF10: 0

## 2023-04-06 NOTE — DISCHARGE INSTRUCTIONS
[address] Physician Orders and Discharge [facility]  [address] [facility], [address] [place] [address]  Telephone: [phone]  [address] 8:00 am - 4:30 pm and Friday 8:00 am - 12:00 pm.          NAME:  Paz Adam  YOB: 1970  MEDICAL RECORD NUMBER:  [id number]  DATE:  4/6/2023     Important Reminders:   Please wash hands with soap and water before and after every dressing change. Do not scrub wounds. Keep wounds dry in shower unless otherwise instructed by the physician. SMOKING can slow would healing. Stop smoking as soon as possible to improve healing and prevent further complications associated with smoking. Irina-Wound Topical Treatments:  Do not apply lotions, creams, or ointments to wound bed unless directed. [] Apply moisturizing lotion to skin surrounding the wound prior to dressing change.  [] Apply antifungal ointment to skin surrounding the wound prior to dressing change.  [] Apply thin film of no sting moisture barrier ointment to skin immediately around      wound. [] Other:         Wound Location: RIGHT AND LEFT LOWER ARM WOUNDS     Wound Cleansing:      Primary Dressing:  [x] HYDROFERA BLUR TRANSFER  []      Secondary Dressing:  [x] DRY GAUZE (ABD PADS)  [x] ROLL GAUZE        COBAN APPLIED LIGHTLY TO KEEP DRESSING IN PLACE        Dressing Frequency:  [x] THREE TIMES PER WEEK  (MAY SOAK DRESSING WITH SALINE TO REMOVE DRESSING IF NEEDED  )  [] Do Not Change Dressing                                                   [] Assistive Devices     Use as instructed by the provider        Activity: Activity as Tolerated        Dietary:   Continue your diet as tolerated. Protein is a key nutrient in helping to repair damaged tissue and promote new tissue growth. Good sources of protein include milk, yogurt, cheese, fish, lean meat and beans.   If you are DIABETIC, having diabetes can make

## 2023-04-06 NOTE — PROGRESS NOTES
wound prior to dressing change.  [] Apply antifungal ointment to skin surrounding the wound prior to dressing change.  [] Apply thin film of no sting moisture barrier ointment to skin immediately around      wound. [] Other:         Wound Location: RIGHT AND LEFT LOWER ARM WOUNDS     Wound Cleansing:      Primary Dressing:  [x] HYDROFERA BLUR TRANSFER  []      Secondary Dressing:  [x] DRY GAUZE (ABD PADS)  [x] ROLL GAUZE        COBAN APPLIED LIGHTLY TO KEEP DRESSING IN PLACE        Dressing Frequency:  [x] THREE TIMES PER WEEK  (MAY SOAK DRESSING WITH SALINE TO REMOVE DRESSING IF NEEDED  )  [] Do Not Change Dressing                                                   [] Assistive Devices     Use as instructed by the provider        Activity: Activity as Tolerated        Dietary:   Continue your diet as tolerated. Protein is a key nutrient in helping to repair damaged tissue and promote new tissue growth. Good sources of protein include milk, yogurt, cheese, fish, lean meat and beans. If you are DIABETIC, having diabetes can make it hard for wounds to heal. Try to keep your blood sugar within it's target range. Limit Sodium, Alcohol and Sugar. Pain:   Please Note some pain, drainage and/or bleeding might be expected after seeing the provider. TO HELP ALLEVIATE PAIN WE RECOMMEND THE FOLLOWING  Elevate the affected limb. Use over the counter medications as permitted by your family doctor. For Persistent Pain not relieved by the above interventions, please notify your family doctor.      Return Appointment:  [x] Return Appointment: With DR Murali Alejandre  in 2 Week(s)  [x] Wound and dressing supply provider: [facility]  [] ECF or Home Healthcare:  [] Wound Assessment:         [] Physician or NP scheduled for Wound Assessment:   [] Orders placed during your visit:        : Oswaldo Alatorre      Electronically signed by Dre Schneider RN on 4/6/2023 at 900 [facility] Information: Should you

## 2023-04-17 NOTE — DISCHARGE INSTRUCTIONS
make it hard for wounds to heal. Try to keep your blood sugar within it's target range. Limit Sodium, Alcohol and Sugar. Pain:   Please Note some pain, drainage and/or bleeding might be expected after seeing the provider. TO HELP ALLEVIATE PAIN WE RECOMMEND THE FOLLOWING  Elevate the affected limb. Use over the counter medications as permitted by your family doctor. For Persistent Pain not relieved by the above interventions, please notify your family doctor. Return Appointment:  [x] Return Appointment: With DR Marj Greenberg  in 2 Week(s)  [x] Wound and dressing supply provider: [facility]  [] ECF or Home Healthcare:  [] Wound Assessment:         [] Physician or NP scheduled for Wound Assessment:   [] Orders placed during your visit:        : Dee Bautista      Electronically signed by Dada Ornelas RN on 4/6/2023 at 24[address] [facility] Information: Should you experience any significant changes in your wound(s) or have questions about your wound care, please contact the [address] [place] [address] at [address] 8:00 am - 4:30 pm and Friday 8:00 am - 12:30 pm.  If you need help with your wound outside these hours and cannot wait until we are again available, contact your PCP or go to the hospital emergency room. PLEASE NOTE: IF YOU ARE UNABLE TO OBTAIN WOUND SUPPLIES, CONTINUE TO USE THE SUPPLIES YOU HAVE AVAILABLE UNTIL YOU ARE ABLE TO REACH US. IT IS MOST IMPORTANT TO KEEP THE WOUND COVERED AT ALL TIMES.      Physician Signature:_______________________     Date: ___________ Time:  ____________                                  Dr Caio Borja

## 2023-04-20 ENCOUNTER — HOSPITAL ENCOUNTER (OUTPATIENT)
Dept: WOUND CARE | Age: 53
Discharge: HOME | End: 2023-04-20

## 2023-04-24 NOTE — DISCHARGE INSTRUCTIONS
[address] Physician Orders and Discharge Deanna 91  9[address] [facility], [address] [place] [address]  Telephone: [phone]  [address] 8:00 am - 4:30 pm and Friday 8:00 am - 12:00 pm.          NAME:  Yasmin Teresa  YOB: 1970  MEDICAL RECORD NUMBER:  [id number]  DATE:  4/27/2023     Important Reminders:   Please wash hands with soap and water before and after every dressing change. Do not scrub wounds. Keep wounds dry in shower unless otherwise instructed by the physician. SMOKING can slow would healing. Stop smoking as soon as possible to improve healing and prevent further complications associated with smoking. Irina-Wound Topical Treatments:  Do not apply lotions, creams, or ointments to wound bed unless directed. [] Apply moisturizing lotion to skin surrounding the wound prior to dressing change.  [] Apply antifungal ointment to skin surrounding the wound prior to dressing change.  [] Apply thin film of no sting moisture barrier ointment to skin immediately around      wound. [] Other:         Wound Location: RIGHT AND LEFT LOWER ARM WOUNDS     Wound Cleansing:      Primary Dressing:  [x] HYDROFERA BLUE TRANSFER  []      Secondary Dressing:  [x] DRY GAUZE (ABD PADS)  [x] ROLL GAUZE        COBAN APPLIED LIGHTLY TO KEEP DRESSING IN PLACE        Dressing Frequency:  [x] THREE TIMES PER WEEK  (MAY SOAK DRESSING WITH SALINE TO REMOVE DRESSING IF NEEDED  )  [] Do Not Change Dressing                                                   [] Assistive Devices     Use as instructed by the provider        Activity: Activity as Tolerated        Dietary:   Continue your diet as tolerated. Protein is a key nutrient in helping to repair damaged tissue and promote new tissue growth. Good sources of protein include milk, yogurt, cheese, fish, lean meat and beans.   If you are DIABETIC, having diabetes can

## 2023-04-27 ENCOUNTER — HOSPITAL ENCOUNTER (OUTPATIENT)
Dept: WOUND CARE | Age: 53
Discharge: HOME | End: 2023-04-27
Payer: COMMERCIAL

## 2023-04-27 VITALS
RESPIRATION RATE: 18 BRPM | TEMPERATURE: 97.7 F | HEART RATE: 86 BPM | DIASTOLIC BLOOD PRESSURE: 82 MMHG | SYSTOLIC BLOOD PRESSURE: 125 MMHG

## 2023-04-27 DIAGNOSIS — S41.101A OPEN WOUND OF ARM, BILATERAL: ICD-10-CM

## 2023-04-27 DIAGNOSIS — S41.102A OPEN WOUND OF ARM, BILATERAL: ICD-10-CM

## 2023-04-27 DIAGNOSIS — F19.10 DRUG ABUSE, IV (HCC): ICD-10-CM

## 2023-04-27 DIAGNOSIS — L02.213 CUTANEOUS ABSCESS OF CHEST WALL: Primary | ICD-10-CM

## 2023-04-27 DIAGNOSIS — L03.119 CELLULITIS OF UPPER EXTREMITY, UNSPECIFIED LATERALITY: ICD-10-CM

## 2023-04-27 PROCEDURE — 11045 DBRDMT SUBQ TISS EACH ADDL: CPT | Performed by: SURGERY

## 2023-04-27 PROCEDURE — 11045 DBRDMT SUBQ TISS EACH ADDL: CPT

## 2023-04-27 PROCEDURE — 11042 DBRDMT SUBQ TIS 1ST 20SQCM/<: CPT | Performed by: SURGERY

## 2023-04-27 PROCEDURE — 11042 DBRDMT SUBQ TIS 1ST 20SQCM/<: CPT

## 2023-04-27 RX ORDER — LIDOCAINE 50 MG/G
OINTMENT TOPICAL ONCE
OUTPATIENT
Start: 2023-04-27 | End: 2023-04-27

## 2023-04-27 RX ORDER — LIDOCAINE HYDROCHLORIDE 40 MG/ML
SOLUTION TOPICAL ONCE
Status: COMPLETED | OUTPATIENT
Start: 2023-04-27 | End: 2023-04-27

## 2023-04-27 RX ORDER — GENTAMICIN SULFATE 1 MG/G
OINTMENT TOPICAL ONCE
OUTPATIENT
Start: 2023-04-27 | End: 2023-04-27

## 2023-04-27 RX ORDER — CLOBETASOL PROPIONATE 0.5 MG/G
OINTMENT TOPICAL ONCE
OUTPATIENT
Start: 2023-04-27 | End: 2023-04-27

## 2023-04-27 RX ORDER — LIDOCAINE 40 MG/G
CREAM TOPICAL ONCE
OUTPATIENT
Start: 2023-04-27 | End: 2023-04-27

## 2023-04-27 RX ORDER — LIDOCAINE HYDROCHLORIDE 20 MG/ML
JELLY TOPICAL ONCE
OUTPATIENT
Start: 2023-04-27 | End: 2023-04-27

## 2023-04-27 RX ORDER — BACITRACIN ZINC AND POLYMYXIN B SULFATE 500; 1000 [USP'U]/G; [USP'U]/G
OINTMENT TOPICAL ONCE
OUTPATIENT
Start: 2023-04-27 | End: 2023-04-27

## 2023-04-27 RX ORDER — GINSENG 100 MG
CAPSULE ORAL ONCE
OUTPATIENT
Start: 2023-04-27 | End: 2023-04-27

## 2023-04-27 RX ORDER — BACITRACIN, NEOMYCIN, POLYMYXIN B 400; 3.5; 5 [USP'U]/G; MG/G; [USP'U]/G
OINTMENT TOPICAL ONCE
OUTPATIENT
Start: 2023-04-27 | End: 2023-04-27

## 2023-04-27 RX ORDER — LIDOCAINE HYDROCHLORIDE 40 MG/ML
SOLUTION TOPICAL ONCE
OUTPATIENT
Start: 2023-04-27 | End: 2023-04-27

## 2023-04-27 RX ORDER — BETAMETHASONE DIPROPIONATE 0.05 %
OINTMENT (GRAM) TOPICAL ONCE
OUTPATIENT
Start: 2023-04-27 | End: 2023-04-27

## 2023-04-27 RX ADMIN — LIDOCAINE HYDROCHLORIDE: 40 SOLUTION TOPICAL at 15:43

## 2023-04-27 ASSESSMENT — PAIN SCALES - GENERAL: PAINLEVEL_OUTOF10: 0
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
## 2023-05-08 NOTE — DISCHARGE INSTRUCTIONS
make it hard for wounds to heal. Try to keep your blood sugar within it's target range. Limit Sodium, Alcohol and Sugar. Pain:   Please Note some pain, drainage and/or bleeding might be expected after seeing the provider. TO HELP ALLEVIATE PAIN WE RECOMMEND THE FOLLOWING  Elevate the affected limb. Use over the counter medications as permitted by your family doctor. For Persistent Pain not relieved by the above interventions, please notify your family doctor. Return Appointment:  [x] Return Appointment: With DR Venkatesh Rush  in 2 Week(s)  [x] Wound and dressing supply provider: [facility]  [] ECF or Home Healthcare:  [] Wound Assessment:         [] Physician or NP scheduled for Wound Assessment:   [] Orders placed during your visit:     **CALL YOUR FAMILY DOCTOR ABOUT YOUR LEFT WRIST DROP - ULISSES Weiner**        : Sherel Sandhoff      Electronically signed by Clint Daley RN on 5/11/2023 at 4:22 PM          [address] [facility] Information: Should you experience any significant changes in your wound(s) or have questions about your wound care, please contact the [address] [place] [address] at [address] 8:00 am - 4:30 pm and Friday 8:00 am - 12:30 pm.  If you need help with your wound outside these hours and cannot wait until we are again available, contact your PCP or go to the hospital emergency room. PLEASE NOTE: IF YOU ARE UNABLE TO OBTAIN WOUND SUPPLIES, CONTINUE TO USE THE SUPPLIES YOU HAVE AVAILABLE UNTIL YOU ARE ABLE TO REACH US. IT IS MOST IMPORTANT TO KEEP THE WOUND COVERED AT ALL TIMES.      Physician Signature:_______________________     Date: ___________ Time:  ____________                                  Dr Tate Moreno

## 2023-05-11 ENCOUNTER — HOSPITAL ENCOUNTER (OUTPATIENT)
Dept: WOUND CARE | Age: 53
Discharge: HOME | End: 2023-05-11
Payer: COMMERCIAL

## 2023-05-11 VITALS
TEMPERATURE: 98.6 F | RESPIRATION RATE: 18 BRPM | SYSTOLIC BLOOD PRESSURE: 130 MMHG | HEART RATE: 89 BPM | DIASTOLIC BLOOD PRESSURE: 81 MMHG

## 2023-05-11 DIAGNOSIS — F19.10 DRUG ABUSE, IV (HCC): ICD-10-CM

## 2023-05-11 DIAGNOSIS — L02.213 CUTANEOUS ABSCESS OF CHEST WALL: ICD-10-CM

## 2023-05-11 DIAGNOSIS — S41.102A OPEN WOUND OF ARM, BILATERAL: Primary | ICD-10-CM

## 2023-05-11 DIAGNOSIS — S41.101A OPEN WOUND OF ARM, BILATERAL: Primary | ICD-10-CM

## 2023-05-11 DIAGNOSIS — L03.119 CELLULITIS OF UPPER EXTREMITY, UNSPECIFIED LATERALITY: ICD-10-CM

## 2023-05-11 PROCEDURE — 11045 DBRDMT SUBQ TISS EACH ADDL: CPT

## 2023-05-11 PROCEDURE — 11042 DBRDMT SUBQ TIS 1ST 20SQCM/<: CPT

## 2023-05-11 RX ORDER — CLOBETASOL PROPIONATE 0.5 MG/G
OINTMENT TOPICAL ONCE
OUTPATIENT
Start: 2023-05-11 | End: 2023-05-11

## 2023-05-11 RX ORDER — BETAMETHASONE DIPROPIONATE 0.05 %
OINTMENT (GRAM) TOPICAL ONCE
OUTPATIENT
Start: 2023-05-11 | End: 2023-05-11

## 2023-05-11 RX ORDER — LIDOCAINE HYDROCHLORIDE 20 MG/ML
JELLY TOPICAL ONCE
OUTPATIENT
Start: 2023-05-11 | End: 2023-05-11

## 2023-05-11 RX ORDER — GINSENG 100 MG
CAPSULE ORAL ONCE
OUTPATIENT
Start: 2023-05-11 | End: 2023-05-11

## 2023-05-11 RX ORDER — GENTAMICIN SULFATE 1 MG/G
OINTMENT TOPICAL ONCE
OUTPATIENT
Start: 2023-05-11 | End: 2023-05-11

## 2023-05-11 RX ORDER — LIDOCAINE HYDROCHLORIDE 40 MG/ML
SOLUTION TOPICAL ONCE
OUTPATIENT
Start: 2023-05-11 | End: 2023-05-11

## 2023-05-11 RX ORDER — LIDOCAINE 50 MG/G
OINTMENT TOPICAL ONCE
OUTPATIENT
Start: 2023-05-11 | End: 2023-05-11

## 2023-05-11 RX ORDER — BACITRACIN, NEOMYCIN, POLYMYXIN B 400; 3.5; 5 [USP'U]/G; MG/G; [USP'U]/G
OINTMENT TOPICAL ONCE
OUTPATIENT
Start: 2023-05-11 | End: 2023-05-11

## 2023-05-11 RX ORDER — LIDOCAINE 40 MG/G
CREAM TOPICAL ONCE
OUTPATIENT
Start: 2023-05-11 | End: 2023-05-11

## 2023-05-11 RX ORDER — BACITRACIN ZINC AND POLYMYXIN B SULFATE 500; 1000 [USP'U]/G; [USP'U]/G
OINTMENT TOPICAL ONCE
OUTPATIENT
Start: 2023-05-11 | End: 2023-05-11

## 2023-05-11 RX ORDER — LIDOCAINE HYDROCHLORIDE 40 MG/ML
SOLUTION TOPICAL ONCE
Status: COMPLETED | OUTPATIENT
Start: 2023-05-11 | End: 2023-05-11

## 2023-05-11 RX ADMIN — LIDOCAINE HYDROCHLORIDE 20 ML: 40 SOLUTION TOPICAL at 16:13

## 2023-05-11 ASSESSMENT — PAIN DESCRIPTION - LOCATION: LOCATION: ARM

## 2023-05-11 ASSESSMENT — PAIN - FUNCTIONAL ASSESSMENT: PAIN_FUNCTIONAL_ASSESSMENT: ACTIVITIES ARE NOT PREVENTED

## 2023-05-11 ASSESSMENT — PAIN SCALES - GENERAL
PAINLEVEL_OUTOF10: 4
PAINLEVEL_OUTOF10: 0

## 2023-05-11 ASSESSMENT — PAIN DESCRIPTION - ORIENTATION: ORIENTATION: RIGHT;LEFT

## 2023-05-11 ASSESSMENT — PAIN DESCRIPTION - PAIN TYPE: TYPE: ACUTE PAIN;CHRONIC PAIN

## 2023-05-11 ASSESSMENT — PAIN DESCRIPTION - ONSET: ONSET: ON-GOING

## 2023-05-11 ASSESSMENT — PAIN DESCRIPTION - DESCRIPTORS: DESCRIPTORS: ACHING

## 2023-05-11 ASSESSMENT — PAIN DESCRIPTION - FREQUENCY: FREQUENCY: INTERMITTENT

## 2023-05-11 NOTE — PLAN OF CARE
[facility] Wound Care and Hyperbaric Oxygen Therapy   Physician Orders and Discharge Instructions  [facility]  [address] [facility]   [address], [address]  Telephone: [phone]        LATE ARRIVAL/ Cancellation       NAME:  Jaron Gonzalez OF BIRTH:  1970  MEDICAL RECORD NUMBER:  [id number]  DATE:  5/11/2023  Provider:   Dr Brandon Mcmahan visits to date:3    No-shows to date: 1       Patient status for today's appointment patient:  [x]  Patient did show to his appointment this afternoon but was 30 minutes late to arrive. Dr Tu Hernandez agreed to see patient. Patient aware of [facility] policy about appointment times.       Reason given by patient:              [x]  Conflict with work       Phone call information:   [x]  Patient did call around 3:30 pm, at his scheduled appointment time, telling us he would be late but wound be here at 3:45 pm. Patient did not arrive until 4:00 pm.           Electronically signed by Tyler Xavier RN on 5/11/23 at 4:18 PM EDT
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
## 2023-05-22 NOTE — DISCHARGE INSTRUCTIONS
having diabetes can make it hard for wounds to heal. Try to keep your blood sugar within it's target range. Limit Sodium, Alcohol and Sugar. Pain:   Please Note some pain, drainage and/or bleeding might be expected after seeing the provider. TO HELP ALLEVIATE PAIN WE RECOMMEND THE FOLLOWING  Elevate the affected limb. Use over the counter medications as permitted by your family doctor. For Persistent Pain not relieved by the above interventions, please notify your family doctor. Return Appointment:  [x] Return Appointment: With DR Bocanegra Staff  in 2 Week(s)  [x] Wound and dressing supply provider: [facility]  [] ECF or Home Healthcare:  [] Wound Assessment:         [] Physician or NP scheduled for Wound Assessment:   [] Orders placed during your visit:     **CALL YOUR FAMILY DOCTOR ABOUT YOUR LEFT WRIST DROP - ULISSES Weiner**        : Nghia Apple      Electronically signed by Karlos Gonzalez RN on 5/25/2023 at 4:07 PM             [address]: Should you experience any significant changes in your wound(s) or have questions about your wound care, please contact the [address] [place] [address] at [address] 8:00 am - 4:30 pm and Friday 8:00 am - 12:30 pm.  If you need help with your wound outside these hours and cannot wait until we are again available, contact your PCP or go to the hospital emergency room. PLEASE NOTE: IF YOU ARE UNABLE TO OBTAIN WOUND SUPPLIES, CONTINUE TO USE THE SUPPLIES YOU HAVE AVAILABLE UNTIL YOU ARE ABLE TO REACH US. IT IS MOST IMPORTANT TO KEEP THE WOUND COVERED AT ALL TIMES.      Physician Signature:_______________________     Date: ___________ Time:  ____________                                  Dr Ghazala Gregg

## 2023-05-25 ENCOUNTER — HOSPITAL ENCOUNTER (OUTPATIENT)
Dept: WOUND CARE | Age: 53
Discharge: HOME | End: 2023-05-25
Payer: COMMERCIAL

## 2023-05-25 VITALS
DIASTOLIC BLOOD PRESSURE: 62 MMHG | TEMPERATURE: 98.2 F | HEART RATE: 81 BPM | RESPIRATION RATE: 18 BRPM | SYSTOLIC BLOOD PRESSURE: 113 MMHG

## 2023-05-25 DIAGNOSIS — L02.213 CUTANEOUS ABSCESS OF CHEST WALL: Primary | ICD-10-CM

## 2023-05-25 DIAGNOSIS — S41.102A OPEN WOUND OF ARM, BILATERAL: ICD-10-CM

## 2023-05-25 DIAGNOSIS — F19.10 DRUG ABUSE, IV (HCC): ICD-10-CM

## 2023-05-25 DIAGNOSIS — L03.119 CELLULITIS OF UPPER EXTREMITY, UNSPECIFIED LATERALITY: ICD-10-CM

## 2023-05-25 DIAGNOSIS — S41.101A OPEN WOUND OF ARM, BILATERAL: ICD-10-CM

## 2023-05-25 PROCEDURE — 11045 DBRDMT SUBQ TISS EACH ADDL: CPT

## 2023-05-25 PROCEDURE — 11042 DBRDMT SUBQ TIS 1ST 20SQCM/<: CPT

## 2023-05-25 RX ORDER — LIDOCAINE HYDROCHLORIDE 20 MG/ML
JELLY TOPICAL ONCE
OUTPATIENT
Start: 2023-05-25 | End: 2023-05-25

## 2023-05-25 RX ORDER — BACITRACIN ZINC AND POLYMYXIN B SULFATE 500; 1000 [USP'U]/G; [USP'U]/G
OINTMENT TOPICAL ONCE
OUTPATIENT
Start: 2023-05-25 | End: 2023-05-25

## 2023-05-25 RX ORDER — BETAMETHASONE DIPROPIONATE 0.05 %
OINTMENT (GRAM) TOPICAL ONCE
OUTPATIENT
Start: 2023-05-25 | End: 2023-05-25

## 2023-05-25 RX ORDER — GINSENG 100 MG
CAPSULE ORAL ONCE
OUTPATIENT
Start: 2023-05-25 | End: 2023-05-25

## 2023-05-25 RX ORDER — LIDOCAINE HYDROCHLORIDE 40 MG/ML
SOLUTION TOPICAL ONCE
OUTPATIENT
Start: 2023-05-25 | End: 2023-05-25

## 2023-05-25 RX ORDER — CLOBETASOL PROPIONATE 0.5 MG/G
OINTMENT TOPICAL ONCE
OUTPATIENT
Start: 2023-05-25 | End: 2023-05-25

## 2023-05-25 RX ORDER — LIDOCAINE 50 MG/G
OINTMENT TOPICAL ONCE
OUTPATIENT
Start: 2023-05-25 | End: 2023-05-25

## 2023-05-25 RX ORDER — LIDOCAINE HYDROCHLORIDE 40 MG/ML
SOLUTION TOPICAL ONCE
Status: COMPLETED | OUTPATIENT
Start: 2023-05-25 | End: 2023-05-25

## 2023-05-25 RX ORDER — GENTAMICIN SULFATE 1 MG/G
OINTMENT TOPICAL ONCE
OUTPATIENT
Start: 2023-05-25 | End: 2023-05-25

## 2023-05-25 RX ORDER — BACITRACIN, NEOMYCIN, POLYMYXIN B 400; 3.5; 5 [USP'U]/G; MG/G; [USP'U]/G
OINTMENT TOPICAL ONCE
OUTPATIENT
Start: 2023-05-25 | End: 2023-05-25

## 2023-05-25 RX ORDER — LIDOCAINE 40 MG/G
CREAM TOPICAL ONCE
OUTPATIENT
Start: 2023-05-25 | End: 2023-05-25

## 2023-05-25 RX ADMIN — LIDOCAINE HYDROCHLORIDE: 40 SOLUTION TOPICAL at 15:48

## 2023-05-25 ASSESSMENT — PAIN DESCRIPTION - ORIENTATION: ORIENTATION: RIGHT;LEFT

## 2023-05-25 ASSESSMENT — PAIN SCALES - GENERAL
PAINLEVEL_OUTOF10: 0
PAINLEVEL_OUTOF10: 4

## 2023-05-25 ASSESSMENT — PAIN DESCRIPTION - ONSET: ONSET: ON-GOING

## 2023-05-25 ASSESSMENT — PAIN - FUNCTIONAL ASSESSMENT: PAIN_FUNCTIONAL_ASSESSMENT: ACTIVITIES ARE NOT PREVENTED

## 2023-05-25 ASSESSMENT — PAIN DESCRIPTION - LOCATION: LOCATION: ARM

## 2023-05-25 ASSESSMENT — PAIN DESCRIPTION - FREQUENCY: FREQUENCY: INTERMITTENT

## 2023-05-25 ASSESSMENT — PAIN DESCRIPTION - PAIN TYPE: TYPE: ACUTE PAIN;CHRONIC PAIN

## 2023-05-25 ASSESSMENT — PAIN DESCRIPTION - DESCRIPTORS: DESCRIPTORS: ACHING
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
## 2023-05-25 NOTE — PLAN OF CARE
(l*w) 90.25 05/25/23 1545   Wound Volume (cm^3) 4.95 cm^3 05/25/23 1545   Wound Healing % 99 05/25/23 1545   Post-Procedure Length (cm) 5.6 cm 05/25/23 1606   Post-Procedure Width (cm) 9.1 cm 05/25/23 1606   Post-Procedure Depth (cm) 0.1 cm 05/25/23 1606   Post-Procedure Surface Area (cm^2) 50.96 cm^2 05/25/23 1606   Post-Procedure Volume (cm^3) 5.096 cm^3 05/25/23 1606   Wound Assessment Slough;Dry;Granulation tissue 05/25/23 1545   Drainage Amount Small 05/25/23 1545   Drainage Description Serosanguinous 05/25/23 1545   Odor None 05/25/23 1545   Irina-wound Assessment Dry/flaky 05/25/23 1545   Margins Undefined edges 05/25/23 1545   Wound Thickness Description not for Pressure Injury Full thickness 05/25/23 1545   Number of days: 111       Wound 02/02/23 Arm Right #2 (Noted 2/2022) (Active)   Wound Image    03/02/23 1545   Wound Etiology Traumatic 03/16/23 1530   Dressing Status New dressing applied 05/11/23 1635   Wound Cleansed Cleansed with saline 05/11/23 1635   Dressing/Treatment Collagen;Gauze dressing/dressing sponge;Moisten with saline; Roll gauze;Coban/self-adherent bandages 05/25/23 1615   Wound Length (cm) 15 cm 05/25/23 1545   Wound Width (cm) 18 cm 05/25/23 1545   Wound Depth (cm) 0.1 cm 05/25/23 1545   Wound Surface Area (cm^2) 270 cm^2 05/25/23 1545   Change in Wound Size % (l*w) 52.63 05/25/23 1545   Wound Volume (cm^3) 27 cm^3 05/25/23 1545   Wound Healing % 94 05/25/23 1545   Post-Procedure Length (cm) 15.1 cm 05/25/23 1606   Post-Procedure Width (cm) 18.1 cm 05/25/23 1606   Post-Procedure Depth (cm) 0.1 cm 05/25/23 1606   Post-Procedure Surface Area (cm^2) 273.31 cm^2 05/25/23 1606   Post-Procedure Volume (cm^3) 27.331 cm^3 05/25/23 1606   Wound Assessment Granulation tissue;Slough; Hyper granulation tissue 05/25/23 1545   Drainage Amount Small 05/25/23 1545   Drainage Description Serosanguinous 05/25/23 1545   Odor None 05/25/23 1545   Irina-wound Assessment Dry/flaky 05/25/23 1545   Margins

## 2023-06-05 NOTE — DISCHARGE INSTRUCTIONS
having diabetes can make it hard for wounds to heal. Try to keep your blood sugar within it's target range. Limit Sodium, Alcohol and Sugar. Pain:   Please Note some pain, drainage and/or bleeding might be expected after seeing the provider. TO HELP ALLEVIATE PAIN WE RECOMMEND THE FOLLOWING  Elevate the affected limb. Use over the counter medications as permitted by your family doctor. For Persistent Pain not relieved by the above interventions, please notify your family doctor. Return Appointment:  [x] Return Appointment: With DR Zuhair Francisco  in 2 Week(s)  [x] Wound and dressing supply provider: [facility]  [] ECF or Home Healthcare:  [] Wound Assessment:         [] Physician or NP scheduled for Wound Assessment:   [] Orders placed during your visit:     **CALL YOUR FAMILY DOCTOR ABOUT YOUR LEFT WRIST DROP - ULISSES Weiner**        : Nguyen Alexander      Electronically signed by Shalini Miner RN on 6/8/2023 at 2:49 PM                [address] [facility] Information: Should you experience any significant changes in your wound(s) or have questions about your wound care, please contact the [address] [place] [address] at [address] 8:00 am - 4:30 pm and Friday 8:00 am - 12:30 pm.  If you need help with your wound outside these hours and cannot wait until we are again available, contact your PCP or go to the hospital emergency room. PLEASE NOTE: IF YOU ARE UNABLE TO OBTAIN WOUND SUPPLIES, CONTINUE TO USE THE SUPPLIES YOU HAVE AVAILABLE UNTIL YOU ARE ABLE TO REACH US. IT IS MOST IMPORTANT TO KEEP THE WOUND COVERED AT ALL TIMES.      Physician Signature:_______________________     Date: ___________ Time:  ____________                                  Dr Pierre Cabrera

## 2023-06-08 ENCOUNTER — HOSPITAL ENCOUNTER (OUTPATIENT)
Dept: WOUND CARE | Age: 53
Discharge: HOME | End: 2023-06-08
Payer: COMMERCIAL

## 2023-06-08 VITALS
RESPIRATION RATE: 18 BRPM | DIASTOLIC BLOOD PRESSURE: 73 MMHG | TEMPERATURE: 98.2 F | SYSTOLIC BLOOD PRESSURE: 117 MMHG | HEART RATE: 94 BPM

## 2023-06-08 DIAGNOSIS — L03.119 CELLULITIS OF UPPER EXTREMITY, UNSPECIFIED LATERALITY: ICD-10-CM

## 2023-06-08 DIAGNOSIS — L02.213 CUTANEOUS ABSCESS OF CHEST WALL: ICD-10-CM

## 2023-06-08 DIAGNOSIS — L03.113 CELLULITIS OF RIGHT UPPER EXTREMITY: ICD-10-CM

## 2023-06-08 DIAGNOSIS — S41.102A OPEN WOUND OF ARM, BILATERAL: Primary | ICD-10-CM

## 2023-06-08 DIAGNOSIS — S41.101A OPEN WOUND OF ARM, BILATERAL: Primary | ICD-10-CM

## 2023-06-08 DIAGNOSIS — F19.10 DRUG ABUSE, IV (HCC): ICD-10-CM

## 2023-06-08 PROCEDURE — 11042 DBRDMT SUBQ TIS 1ST 20SQCM/<: CPT | Performed by: SURGERY

## 2023-06-08 PROCEDURE — 11045 DBRDMT SUBQ TISS EACH ADDL: CPT

## 2023-06-08 PROCEDURE — 11042 DBRDMT SUBQ TIS 1ST 20SQCM/<: CPT

## 2023-06-08 PROCEDURE — 11045 DBRDMT SUBQ TISS EACH ADDL: CPT | Performed by: SURGERY

## 2023-06-08 RX ORDER — LIDOCAINE HYDROCHLORIDE 20 MG/ML
JELLY TOPICAL ONCE
OUTPATIENT
Start: 2023-06-08 | End: 2023-06-08

## 2023-06-08 RX ORDER — LIDOCAINE HYDROCHLORIDE 40 MG/ML
SOLUTION TOPICAL ONCE
Status: COMPLETED | OUTPATIENT
Start: 2023-06-08 | End: 2023-06-08

## 2023-06-08 RX ORDER — LIDOCAINE HYDROCHLORIDE 40 MG/ML
SOLUTION TOPICAL ONCE
OUTPATIENT
Start: 2023-06-08 | End: 2023-06-08

## 2023-06-08 RX ORDER — CLOBETASOL PROPIONATE 0.5 MG/G
OINTMENT TOPICAL ONCE
OUTPATIENT
Start: 2023-06-08 | End: 2023-06-08

## 2023-06-08 RX ORDER — IBUPROFEN 200 MG
TABLET ORAL ONCE
OUTPATIENT
Start: 2023-06-08 | End: 2023-06-08

## 2023-06-08 RX ORDER — GENTAMICIN SULFATE 1 MG/G
OINTMENT TOPICAL ONCE
OUTPATIENT
Start: 2023-06-08 | End: 2023-06-08

## 2023-06-08 RX ORDER — LIDOCAINE 40 MG/G
CREAM TOPICAL ONCE
OUTPATIENT
Start: 2023-06-08 | End: 2023-06-08

## 2023-06-08 RX ORDER — BETAMETHASONE DIPROPIONATE 0.05 %
OINTMENT (GRAM) TOPICAL ONCE
OUTPATIENT
Start: 2023-06-08 | End: 2023-06-08

## 2023-06-08 RX ORDER — GINSENG 100 MG
CAPSULE ORAL ONCE
OUTPATIENT
Start: 2023-06-08 | End: 2023-06-08

## 2023-06-08 RX ORDER — LIDOCAINE 50 MG/G
OINTMENT TOPICAL ONCE
OUTPATIENT
Start: 2023-06-08 | End: 2023-06-08

## 2023-06-08 RX ORDER — BACITRACIN ZINC AND POLYMYXIN B SULFATE 500; 1000 [USP'U]/G; [USP'U]/G
OINTMENT TOPICAL ONCE
OUTPATIENT
Start: 2023-06-08 | End: 2023-06-08

## 2023-06-08 RX ADMIN — LIDOCAINE HYDROCHLORIDE: 40 SOLUTION TOPICAL at 14:22

## 2023-06-08 ASSESSMENT — PAIN DESCRIPTION - FREQUENCY: FREQUENCY: INTERMITTENT

## 2023-06-08 ASSESSMENT — PAIN SCALES - GENERAL: PAINLEVEL_OUTOF10: 4

## 2023-06-08 ASSESSMENT — PAIN DESCRIPTION - ORIENTATION: ORIENTATION: RIGHT;LEFT

## 2023-06-08 ASSESSMENT — PAIN DESCRIPTION - DESCRIPTORS: DESCRIPTORS: ACHING

## 2023-06-08 ASSESSMENT — PAIN DESCRIPTION - LOCATION: LOCATION: ARM

## 2023-06-08 ASSESSMENT — PAIN - FUNCTIONAL ASSESSMENT: PAIN_FUNCTIONAL_ASSESSMENT: ACTIVITIES ARE NOT PREVENTED

## 2023-06-08 ASSESSMENT — PAIN DESCRIPTION - ONSET: ONSET: ON-GOING

## 2023-06-08 ASSESSMENT — PAIN DESCRIPTION - PAIN TYPE: TYPE: CHRONIC PAIN

## 2023-06-08 NOTE — PROGRESS NOTES
fracture    ARM SURGERY Bilateral 2/13/2023    DEBRIDEMENT OF BILATERAL ARM WOUNDS performed by Oleh Claude, MD at [address] [place]      skull fracture surgery    LEG SURGERY Right     fracture surgery    OTHER SURGICAL HISTORY      excisional debridement, culture, yimi arms and left foot    PELVIC FRACTURE SURGERY         FAMILY HISTORY    Family History   Problem Relation Age of Onset    High Cholesterol Mother     Diabetes Father     High Cholesterol Father     Diabetes Brother        SOCIAL HISTORY    Social History     Tobacco Use    Smoking status: Every Day     Packs/day: 1.00     Years: 41.00     Pack years: 41.00     Types: Cigarettes    Smokeless tobacco: Never   Vaping Use    Vaping Use: Never used   Substance Use Topics    Alcohol use: No     Comment: quit 2010    Drug use: Yes     Frequency: 24.0 times per week     Types: Opiates , Other-see comments, IV     Comment: IV HEROIN last used 2/8- snorting       ALLERGIES    No Known Allergies    MEDICATIONS    Current Outpatient Medications on File Prior to Encounter   Medication Sig Dispense Refill    linaclotide (LINZESS) 145 MCG capsule Take 1 capsule by mouth every morning (before breakfast) 30 capsule 2    albuterol sulfate HFA (VENTOLIN HFA) 108 (90 Base) MCG/ACT inhaler Inhale 2 puffs into the lungs 4 times daily as needed for Wheezing 18 g 5    ipratropium-albuterol (DUONEB) 0.5-2.5 (3) MG/3ML SOLN nebulizer solution Inhale 3 mLs into the lungs every 4 hours 360 mL 2    Multiple Vitamins-Minerals (THERAPEUTIC MULTIVITAMIN-MINERALS) tablet Take 1 tablet by mouth daily 30 tablet 11    QUEtiapine (SEROQUEL) 300 MG tablet Take 1 tablet by mouth at bedtime 30 tablet 2    Budeson-Glycopyrrol-Formoterol (BREZTRI AEROSPHERE) 160-9-4.8 MCG/ACT AERO 2 puffs bid 1 each 2    doxycycline hyclate (VIBRA-TABS) 100 MG tablet TAKE ONE TABLET BY MOUTH TWICE A DAY FOR 28 DAYS 56 tablet 0     No current facility-administered

## 2023-06-22 ENCOUNTER — HOSPITAL ENCOUNTER (OUTPATIENT)
Dept: OCCUPATIONAL THERAPY | Age: 53
Setting detail: THERAPIES SERIES
Discharge: HOME | End: 2023-06-22
Attending: INTERNAL MEDICINE

## 2023-06-22 ENCOUNTER — HOSPITAL ENCOUNTER (OUTPATIENT)
Dept: WOUND CARE | Age: 53
Discharge: HOME | End: 2023-06-22
Payer: COMMERCIAL

## 2023-06-22 VITALS
TEMPERATURE: 97.3 F | SYSTOLIC BLOOD PRESSURE: 120 MMHG | HEART RATE: 76 BPM | RESPIRATION RATE: 18 BRPM | DIASTOLIC BLOOD PRESSURE: 76 MMHG

## 2023-06-22 DIAGNOSIS — L03.119 CELLULITIS OF UPPER EXTREMITY, UNSPECIFIED LATERALITY: ICD-10-CM

## 2023-06-22 DIAGNOSIS — L02.213 CUTANEOUS ABSCESS OF CHEST WALL: ICD-10-CM

## 2023-06-22 DIAGNOSIS — S41.101A OPEN WOUND OF ARM, BILATERAL: Primary | ICD-10-CM

## 2023-06-22 DIAGNOSIS — F19.10 DRUG ABUSE, IV (HCC): ICD-10-CM

## 2023-06-22 DIAGNOSIS — S41.102A OPEN WOUND OF ARM, BILATERAL: Primary | ICD-10-CM

## 2023-06-22 DIAGNOSIS — L03.113 CELLULITIS OF RIGHT UPPER EXTREMITY: ICD-10-CM

## 2023-06-22 PROCEDURE — 11045 DBRDMT SUBQ TISS EACH ADDL: CPT

## 2023-06-22 PROCEDURE — 11042 DBRDMT SUBQ TIS 1ST 20SQCM/<: CPT

## 2023-06-22 RX ORDER — BETAMETHASONE DIPROPIONATE 0.05 %
OINTMENT (GRAM) TOPICAL ONCE
OUTPATIENT
Start: 2023-06-22 | End: 2023-06-22

## 2023-06-22 RX ORDER — GINSENG 100 MG
CAPSULE ORAL ONCE
OUTPATIENT
Start: 2023-06-22 | End: 2023-06-22

## 2023-06-22 RX ORDER — GENTAMICIN SULFATE 1 MG/G
OINTMENT TOPICAL ONCE
OUTPATIENT
Start: 2023-06-22 | End: 2023-06-22

## 2023-06-22 RX ORDER — IBUPROFEN 200 MG
TABLET ORAL ONCE
OUTPATIENT
Start: 2023-06-22 | End: 2023-06-22

## 2023-06-22 RX ORDER — LIDOCAINE HYDROCHLORIDE 40 MG/ML
SOLUTION TOPICAL ONCE
OUTPATIENT
Start: 2023-06-22 | End: 2023-06-22

## 2023-06-22 RX ORDER — LIDOCAINE HYDROCHLORIDE 20 MG/ML
JELLY TOPICAL ONCE
OUTPATIENT
Start: 2023-06-22 | End: 2023-06-22

## 2023-06-22 RX ORDER — LIDOCAINE 50 MG/G
OINTMENT TOPICAL ONCE
OUTPATIENT
Start: 2023-06-22 | End: 2023-06-22

## 2023-06-22 RX ORDER — CLOBETASOL PROPIONATE 0.5 MG/G
OINTMENT TOPICAL ONCE
OUTPATIENT
Start: 2023-06-22 | End: 2023-06-22

## 2023-06-22 RX ORDER — LIDOCAINE 40 MG/G
CREAM TOPICAL ONCE
OUTPATIENT
Start: 2023-06-22 | End: 2023-06-22

## 2023-06-22 RX ORDER — BACITRACIN ZINC AND POLYMYXIN B SULFATE 500; 1000 [USP'U]/G; [USP'U]/G
OINTMENT TOPICAL ONCE
OUTPATIENT
Start: 2023-06-22 | End: 2023-06-22

## 2023-06-22 RX ORDER — LIDOCAINE HYDROCHLORIDE 40 MG/ML
SOLUTION TOPICAL ONCE
Status: COMPLETED | OUTPATIENT
Start: 2023-06-22 | End: 2023-06-22

## 2023-06-22 RX ADMIN — LIDOCAINE HYDROCHLORIDE: 40 SOLUTION TOPICAL at 15:48

## 2023-06-22 ASSESSMENT — PAIN DESCRIPTION - ORIENTATION
ORIENTATION: RIGHT;LEFT
ORIENTATION: RIGHT;LEFT

## 2023-06-22 ASSESSMENT — PAIN DESCRIPTION - DESCRIPTORS
DESCRIPTORS: ACHING
DESCRIPTORS: ACHING

## 2023-06-22 ASSESSMENT — PAIN SCALES - GENERAL
PAINLEVEL_OUTOF10: 3
PAINLEVEL_OUTOF10: 3

## 2023-06-22 ASSESSMENT — PAIN DESCRIPTION - LOCATION
LOCATION: ARM
LOCATION: ARM

## 2023-06-22 ASSESSMENT — PAIN DESCRIPTION - PAIN TYPE
TYPE: CHRONIC PAIN
TYPE: CHRONIC PAIN

## 2023-06-22 ASSESSMENT — PAIN DESCRIPTION - FREQUENCY
FREQUENCY: INTERMITTENT
FREQUENCY: INTERMITTENT

## 2023-06-22 ASSESSMENT — PAIN DESCRIPTION - ONSET
ONSET: ON-GOING
ONSET: ON-GOING

## 2023-06-22 ASSESSMENT — PAIN - FUNCTIONAL ASSESSMENT
PAIN_FUNCTIONAL_ASSESSMENT: ACTIVITIES ARE NOT PREVENTED
PAIN_FUNCTIONAL_ASSESSMENT: ACTIVITIES ARE NOT PREVENTED

## 2023-06-22 NOTE — PROGRESS NOTES
Occupational Therapy      Occupational Therapy  Cancellation/No-show Note  Patient Name:  Anna Mclaughlin   :  1970   Date:  2023  Cancelled visits to date: 0  No-shows to date: 1    For today's appointment patient:  []    Cancelled  []    Rescheduled appointment  [x]    No-show     Reason given by patient:  []    Patient ill  []    Conflicting appointment  []    No transportation    []    Conflict with work  []    No reason given  []    Other:     Comments:  Pt reports he believed his appointment was next Thursday. Plan to resume next week.      Electronically signed by:  Anjelica Gann OT, OTR/L

## 2023-06-22 NOTE — DISCHARGE INSTRUCTIONS
[address] Physician Orders and Discharge [place] [address]  [address] , [address] [place] [address]  Telephone: [phone]  [address] 8:00 am - 4:30 pm and Friday 8:00 am - 12:00 pm.          NAME:  Rosemary Vasquez  YOB: 1970  MEDICAL RECORD NUMBER:  [id number]  DATE:  6/22/2023     Important Reminders:   Please wash hands with soap and water before and after every dressing change. Do not scrub wounds. Keep wounds dry in shower unless otherwise instructed by the physician. SMOKING can slow would healing. Stop smoking as soon as possible to improve healing and prevent further complications associated with smoking. Irina-Wound Topical Treatments:  Do not apply lotions, creams, or ointments to wound bed unless directed. [] Apply moisturizing lotion to skin surrounding the wound prior to dressing change.  [] Apply antifungal ointment to skin surrounding the wound prior to dressing change.  [] Apply thin film of no sting moisture barrier ointment to skin immediately around      wound. [] Other:         Wound Location: RIGHT AND LEFT LOWER ARM WOUNDS     Wound Cleansing:      Primary Dressing:  [x] PLAIN COLLAGEN SLIGHTLY MOISTENED WITH SALINE  []      Secondary Dressing:  [x] DRY GAUZE (ABD PADS)  [x] ROLL GAUZE        COBAN APPLIED LIGHTLY TO KEEP DRESSING IN PLACE        Dressing Frequency:  [x] THREE TIMES PER WEEK  (MAY SOAK DRESSING WITH SALINE TO REMOVE DRESSING IF NEEDED  )  [] Do Not Change Dressing                                                   [] Assistive Devices     Use as instructed by the provider        Activity: Activity as Tolerated        Dietary:   Continue your diet as tolerated. Protein is a key nutrient in helping to repair damaged tissue and promote new tissue growth. Good sources of protein include milk, yogurt, cheese, fish, lean meat and beans.   If you are DIABETIC,

## 2023-06-29 ENCOUNTER — HOSPITAL ENCOUNTER (OUTPATIENT)
Dept: OCCUPATIONAL THERAPY | Age: 53
Setting detail: THERAPIES SERIES
Discharge: HOME | End: 2023-06-29
Attending: INTERNAL MEDICINE

## 2023-07-06 ENCOUNTER — HOSPITAL ENCOUNTER (OUTPATIENT)
Dept: WOUND CARE | Age: 53
Discharge: HOME | End: 2023-07-06
Payer: COMMERCIAL

## 2023-07-06 VITALS
HEART RATE: 87 BPM | TEMPERATURE: 97.9 F | DIASTOLIC BLOOD PRESSURE: 69 MMHG | RESPIRATION RATE: 18 BRPM | SYSTOLIC BLOOD PRESSURE: 111 MMHG

## 2023-07-06 DIAGNOSIS — S41.102A OPEN WOUND OF ARM, BILATERAL: Primary | ICD-10-CM

## 2023-07-06 DIAGNOSIS — L03.119 CELLULITIS OF UPPER EXTREMITY, UNSPECIFIED LATERALITY: ICD-10-CM

## 2023-07-06 DIAGNOSIS — L02.213 CUTANEOUS ABSCESS OF CHEST WALL: ICD-10-CM

## 2023-07-06 DIAGNOSIS — F19.10 DRUG ABUSE, IV (HCC): ICD-10-CM

## 2023-07-06 DIAGNOSIS — S41.101A OPEN WOUND OF ARM, BILATERAL: Primary | ICD-10-CM

## 2023-07-06 PROCEDURE — 11042 DBRDMT SUBQ TIS 1ST 20SQCM/<: CPT

## 2023-07-06 PROCEDURE — 11045 DBRDMT SUBQ TISS EACH ADDL: CPT

## 2023-07-06 RX ORDER — BETAMETHASONE DIPROPIONATE 0.05 %
OINTMENT (GRAM) TOPICAL ONCE
OUTPATIENT
Start: 2023-07-06 | End: 2023-07-06

## 2023-07-06 RX ORDER — LIDOCAINE HYDROCHLORIDE 20 MG/ML
JELLY TOPICAL ONCE
OUTPATIENT
Start: 2023-07-06 | End: 2023-07-06

## 2023-07-06 RX ORDER — GINSENG 100 MG
CAPSULE ORAL ONCE
OUTPATIENT
Start: 2023-07-06 | End: 2023-07-06

## 2023-07-06 RX ORDER — LIDOCAINE 50 MG/G
OINTMENT TOPICAL ONCE
OUTPATIENT
Start: 2023-07-06 | End: 2023-07-06

## 2023-07-06 RX ORDER — IBUPROFEN 200 MG
TABLET ORAL ONCE
OUTPATIENT
Start: 2023-07-06 | End: 2023-07-06

## 2023-07-06 RX ORDER — LIDOCAINE HYDROCHLORIDE 40 MG/ML
SOLUTION TOPICAL ONCE
Status: COMPLETED | OUTPATIENT
Start: 2023-07-06 | End: 2023-07-06

## 2023-07-06 RX ORDER — CLOBETASOL PROPIONATE 0.5 MG/G
OINTMENT TOPICAL ONCE
OUTPATIENT
Start: 2023-07-06 | End: 2023-07-06

## 2023-07-06 RX ORDER — LIDOCAINE HYDROCHLORIDE 40 MG/ML
SOLUTION TOPICAL ONCE
OUTPATIENT
Start: 2023-07-06 | End: 2023-07-06

## 2023-07-06 RX ORDER — GENTAMICIN SULFATE 1 MG/G
OINTMENT TOPICAL ONCE
OUTPATIENT
Start: 2023-07-06 | End: 2023-07-06

## 2023-07-06 RX ORDER — LIDOCAINE 40 MG/G
CREAM TOPICAL ONCE
OUTPATIENT
Start: 2023-07-06 | End: 2023-07-06

## 2023-07-06 RX ORDER — BACITRACIN ZINC AND POLYMYXIN B SULFATE 500; 1000 [USP'U]/G; [USP'U]/G
OINTMENT TOPICAL ONCE
OUTPATIENT
Start: 2023-07-06 | End: 2023-07-06

## 2023-07-06 RX ADMIN — LIDOCAINE HYDROCHLORIDE: 40 SOLUTION TOPICAL at 15:49

## 2023-07-06 ASSESSMENT — PAIN SCALES - GENERAL: PAINLEVEL_OUTOF10: 0

## 2023-07-06 NOTE — PLAN OF CARE
[address] [place] [address] [address]:     [facility] [address], [place]  E:[phone] f: [phone]     [address]:     [address] [place] [address]  [address] OFFICE [facility] [address]  [address]  [phone]  WOUND CARE Dept: [phone]   [facility] NUMBER [unfilled]    Patient Information:      Renée Landa  [address] [place]   [phone]   : 1970  AGE: 47 y.o. GENDER: male   TODAYS DATE:  2023    Insurance:      [place]:  [place]  [place]  Effective Date: 2023  [id number] - (Medicaid Managed)    SECONDARY INSURANCE:  Plan:   Coverage:   Effective Date:   [unfilled]    [unfilled]   [unfilled]     Patient Wound Information:      Problem List Items Addressed This Visit          Other    Drug abuse, IV ([address])    Relevant Orders    Initiate Outpatient Wound Care Protocol    Cutaneous abscess of chest wall    Relevant Orders    Initiate Outpatient Wound Care Protocol    Cellulitis of upper extremity    Relevant Orders    Initiate Outpatient Wound Care Protocol    Open wound of arm, bilateral - Primary    Relevant Orders    Initiate Outpatient Wound Care Protocol     ICD-10 codes: K03.838L ; C26.985G    WOUNDS REQUIRING DRESSING SUPPLIES:     Wound 23 Arm Left #1 (Noted 2022) (Active)   Wound Image   23 1549   Wound Etiology Traumatic 23 1530   Dressing Status Old drainage noted 23 1549   Wound Cleansed Cleansed with saline 23 1642   Dressing/Treatment Collagen;Moisten with saline; Roll gauze; Other (comment); Coban/self-adherent bandages;ABD 23 1642   Wound Length (cm) 7 cm 23 1549   Wound Width (cm) 6.7 cm 23 1549   Wound Depth (cm) 0.1 cm 23 1549   Wound Surface Area (cm^2) 46.9 cm^2 23 1549   Change in Wound Size % (l*w) 90.76 23 1549   Wound Volume (cm^3)
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
## 2023-07-17 NOTE — DISCHARGE INSTRUCTIONS
having diabetes can make it hard for wounds to heal. Try to keep your blood sugar within it's target range. Limit Sodium, Alcohol and Sugar. Pain:   Please Note some pain, drainage and/or bleeding might be expected after seeing the provider. TO HELP ALLEVIATE PAIN WE RECOMMEND THE FOLLOWING  Elevate the affected limb. Use over the counter medications as permitted by your family doctor. For Persistent Pain not relieved by the above interventions, please notify your family doctor. Return Appointment:  [x] Return Appointment: With DR Esther Rogers  in 2 Week(s)  [x] Wound and dressing supply provider: Keith Arriaza  ( NEW ORDER SENT 7/6)  [] ECF or Home Healthcare:  [] Wound Assessment:         [] Physician or NP scheduled for Wound Assessment:   [] Orders placed during your visit:     **MAKE APPOINTMENT WITH  NEUROLOGY REGARDING  LEFT WRIST DROP **     **CALL YOUR FAMILY DOCTOR ABOUT YOUR LEFT WRIST DROP - MAY TRY WRIST SPLINT**        : Irina Rose      Electronically signed by Cristiana Raza RN on 6/22/2023 at 4:35 PM           [address] [place] [address] Information: Should you experience any significant changes in your wound(s) or have questions about your wound care, please contact the [address] [place] [address] at [address] [place] [address] 8:00 am - 4:30 pm and Friday 8:00 am - 12:30 pm.  If you need help with your wound outside these hours and cannot wait until we are again available, contact your PCP or go to the hospital emergency room. PLEASE NOTE: IF YOU ARE UNABLE TO OBTAIN WOUND SUPPLIES, CONTINUE TO USE THE SUPPLIES YOU HAVE AVAILABLE UNTIL YOU ARE ABLE TO REACH US. IT IS MOST IMPORTANT TO KEEP THE WOUND COVERED AT ALL TIMES.      Physician Signature:_______________________     Date: ___________ Time:  ____________                                  Dr Bobby Spencer

## 2023-07-20 ENCOUNTER — HOSPITAL ENCOUNTER (OUTPATIENT)
Dept: WOUND CARE | Age: 53
Discharge: HOME | End: 2023-07-20

## 2023-07-21 NOTE — DISCHARGE INSTRUCTIONS
[address] [place] Physician Orders and Discharge [address] [place] [address], [facility], [address] [place] [address]  Telephone: [phone] [address] 8:00 am - 4:30 pm and Friday 8:00 am - 12:00 pm.          NAME:  Jordyn May  YOB: 1970  MEDICAL RECORD NUMBER:  [id number]  DATE:  7/27/2023     Important Reminders:   Please wash hands with soap and water before and after every dressing change. Do not scrub wounds. Keep wounds dry in shower unless otherwise instructed by the physician. SMOKING can slow would healing. Stop smoking as soon as possible to improve healing and prevent further complications associated with smoking. Irina-Wound Topical Treatments:  Do not apply lotions, creams, or ointments to wound bed unless directed. [] Apply moisturizing lotion to skin surrounding the wound prior to dressing change.  [] Apply antifungal ointment to skin surrounding the wound prior to dressing change.  [] Apply thin film of no sting moisture barrier ointment to skin immediately around      wound. [] Other:         Wound Location: RIGHT AND LEFT LOWER ARM WOUNDS     Wound Cleansing:      Primary Dressing:  [x] PLAIN COLLAGEN SLIGHTLY MOISTENED WITH SALINE  []      Secondary Dressing:  [x] DRY GAUZE (ABD PADS)  [x] ROLL GAUZE        COBAN APPLIED LIGHTLY TO KEEP DRESSING IN PLACE        Dressing Frequency:  [x] THREE TIMES PER WEEK  (MAY SOAK DRESSING WITH SALINE TO REMOVE DRESSING IF NEEDED  )  [] Do Not Change Dressing                                                   [] Assistive Devices     Use as instructed by the provider        Activity: Activity as Tolerated        Dietary:   Continue your diet as tolerated. Protein is a key nutrient in helping to repair damaged tissue and promote new tissue growth. Good sources of protein include milk, yogurt, cheese, fish, lean meat and beans.   If you are DIABETIC,

## 2023-07-27 ENCOUNTER — HOSPITAL ENCOUNTER (OUTPATIENT)
Dept: WOUND CARE | Age: 53
Discharge: HOME | End: 2023-07-27
Payer: COMMERCIAL

## 2023-07-27 VITALS
RESPIRATION RATE: 18 BRPM | SYSTOLIC BLOOD PRESSURE: 158 MMHG | DIASTOLIC BLOOD PRESSURE: 78 MMHG | HEART RATE: 105 BPM | TEMPERATURE: 98 F

## 2023-07-27 DIAGNOSIS — L02.213 CUTANEOUS ABSCESS OF CHEST WALL: ICD-10-CM

## 2023-07-27 DIAGNOSIS — S41.101A OPEN WOUND OF ARM, BILATERAL: Primary | ICD-10-CM

## 2023-07-27 DIAGNOSIS — L03.119 CELLULITIS OF UPPER EXTREMITY, UNSPECIFIED LATERALITY: ICD-10-CM

## 2023-07-27 DIAGNOSIS — S41.102A OPEN WOUND OF ARM, BILATERAL: Primary | ICD-10-CM

## 2023-07-27 DIAGNOSIS — F19.10 DRUG ABUSE, IV (HCC): ICD-10-CM

## 2023-07-27 PROCEDURE — 11045 DBRDMT SUBQ TISS EACH ADDL: CPT

## 2023-07-27 PROCEDURE — 11042 DBRDMT SUBQ TIS 1ST 20SQCM/<: CPT | Performed by: SURGERY

## 2023-07-27 PROCEDURE — 11045 DBRDMT SUBQ TISS EACH ADDL: CPT | Performed by: SURGERY

## 2023-07-27 PROCEDURE — 11042 DBRDMT SUBQ TIS 1ST 20SQCM/<: CPT

## 2023-07-27 RX ORDER — IBUPROFEN 200 MG
TABLET ORAL ONCE
OUTPATIENT
Start: 2023-07-27 | End: 2023-07-27

## 2023-07-27 RX ORDER — LIDOCAINE 40 MG/G
CREAM TOPICAL ONCE
OUTPATIENT
Start: 2023-07-27 | End: 2023-07-27

## 2023-07-27 RX ORDER — CLOBETASOL PROPIONATE 0.5 MG/G
OINTMENT TOPICAL ONCE
OUTPATIENT
Start: 2023-07-27 | End: 2023-07-27

## 2023-07-27 RX ORDER — LIDOCAINE 50 MG/G
OINTMENT TOPICAL ONCE
OUTPATIENT
Start: 2023-07-27 | End: 2023-07-27

## 2023-07-27 RX ORDER — GENTAMICIN SULFATE 1 MG/G
OINTMENT TOPICAL ONCE
OUTPATIENT
Start: 2023-07-27 | End: 2023-07-27

## 2023-07-27 RX ORDER — LIDOCAINE HYDROCHLORIDE 40 MG/ML
SOLUTION TOPICAL ONCE
Status: COMPLETED | OUTPATIENT
Start: 2023-07-27 | End: 2023-07-27

## 2023-07-27 RX ORDER — BACITRACIN ZINC AND POLYMYXIN B SULFATE 500; 1000 [USP'U]/G; [USP'U]/G
OINTMENT TOPICAL ONCE
OUTPATIENT
Start: 2023-07-27 | End: 2023-07-27

## 2023-07-27 RX ORDER — LIDOCAINE HYDROCHLORIDE 20 MG/ML
JELLY TOPICAL ONCE
OUTPATIENT
Start: 2023-07-27 | End: 2023-07-27

## 2023-07-27 RX ORDER — LIDOCAINE HYDROCHLORIDE 40 MG/ML
SOLUTION TOPICAL ONCE
OUTPATIENT
Start: 2023-07-27 | End: 2023-07-27

## 2023-07-27 RX ORDER — BETAMETHASONE DIPROPIONATE 0.05 %
OINTMENT (GRAM) TOPICAL ONCE
OUTPATIENT
Start: 2023-07-27 | End: 2023-07-27

## 2023-07-27 RX ORDER — GINSENG 100 MG
CAPSULE ORAL ONCE
OUTPATIENT
Start: 2023-07-27 | End: 2023-07-27

## 2023-07-27 RX ADMIN — LIDOCAINE HYDROCHLORIDE: 40 SOLUTION TOPICAL at 15:38

## 2023-07-27 ASSESSMENT — PAIN SCALES - GENERAL: PAINLEVEL_OUTOF10: 0
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
## 2023-08-04 NOTE — DISCHARGE INSTRUCTIONS
[address] [place] Physician Orders and Discharge [address] [place] [address]  [address], [address], [address] [place] [address]  Telephone: [phone] [address] 8:00 am - 4:30 pm and Friday 8:00 am - 12:00 pm.          NAME:  Jordyn May  YOB: 1970  MEDICAL RECORD NUMBER:  [id number]  DATE:  8/10/2023     Important Reminders:   Please wash hands with soap and water before and after every dressing change. Do not scrub wounds. Keep wounds dry in shower unless otherwise instructed by the physician. SMOKING can slow would healing. Stop smoking as soon as possible to improve healing and prevent further complications associated with smoking. Irina-Wound Topical Treatments:  Do not apply lotions, creams, or ointments to wound bed unless directed. [] Apply moisturizing lotion to skin surrounding the wound prior to dressing change.  [] Apply antifungal ointment to skin surrounding the wound prior to dressing change.  [] Apply thin film of no sting moisture barrier ointment to skin immediately around      wound. [] Other:         Wound Location: RIGHT AND LEFT LOWER ARM WOUNDS     Wound Cleansing:      Primary Dressing:  [x] PLAIN COLLAGEN SLIGHTLY MOISTENED WITH SALINE  []      Secondary Dressing:  [x] DRY GAUZE (ABD PADS)  [x] ROLL GAUZE        COBAN APPLIED LIGHTLY TO KEEP DRESSING IN PLACE        Dressing Frequency:  [x] THREE TIMES PER WEEK  (MAY SOAK DRESSING WITH SALINE TO REMOVE DRESSING IF NEEDED  )  [] Do Not Change Dressing                                                   [] Assistive Devices     Use as instructed by the provider        Activity: Activity as Tolerated        Dietary:   Continue your diet as tolerated. Protein is a key nutrient in helping to repair damaged tissue and promote new tissue growth. Good sources of protein include milk, yogurt, cheese, fish, lean meat and beans.   If you are DIABETIC,

## 2023-08-10 ENCOUNTER — HOSPITAL ENCOUNTER (OUTPATIENT)
Dept: WOUND CARE | Age: 53
Discharge: HOME | End: 2023-08-10
Payer: COMMERCIAL

## 2023-08-10 VITALS — TEMPERATURE: 98.1 F | RESPIRATION RATE: 18 BRPM | SYSTOLIC BLOOD PRESSURE: 137 MMHG | DIASTOLIC BLOOD PRESSURE: 78 MMHG

## 2023-08-10 DIAGNOSIS — L03.113 CELLULITIS OF RIGHT UPPER EXTREMITY: ICD-10-CM

## 2023-08-10 DIAGNOSIS — F19.10 DRUG ABUSE, IV (HCC): ICD-10-CM

## 2023-08-10 DIAGNOSIS — S41.101A OPEN WOUND OF ARM, BILATERAL: Primary | ICD-10-CM

## 2023-08-10 DIAGNOSIS — L02.213 CUTANEOUS ABSCESS OF CHEST WALL: ICD-10-CM

## 2023-08-10 DIAGNOSIS — L03.119 CELLULITIS OF UPPER EXTREMITY, UNSPECIFIED LATERALITY: ICD-10-CM

## 2023-08-10 DIAGNOSIS — S41.102A OPEN WOUND OF ARM, BILATERAL: Primary | ICD-10-CM

## 2023-08-10 PROCEDURE — 11042 DBRDMT SUBQ TIS 1ST 20SQCM/<: CPT

## 2023-08-10 PROCEDURE — 11045 DBRDMT SUBQ TISS EACH ADDL: CPT

## 2023-08-10 RX ORDER — LIDOCAINE HYDROCHLORIDE 40 MG/ML
SOLUTION TOPICAL ONCE
Status: COMPLETED | OUTPATIENT
Start: 2023-08-10 | End: 2023-08-10

## 2023-08-10 RX ORDER — CLOBETASOL PROPIONATE 0.5 MG/G
OINTMENT TOPICAL ONCE
OUTPATIENT
Start: 2023-08-10 | End: 2023-08-10

## 2023-08-10 RX ORDER — BETAMETHASONE DIPROPIONATE 0.05 %
OINTMENT (GRAM) TOPICAL ONCE
OUTPATIENT
Start: 2023-08-10 | End: 2023-08-10

## 2023-08-10 RX ORDER — BACITRACIN ZINC AND POLYMYXIN B SULFATE 500; 1000 [USP'U]/G; [USP'U]/G
OINTMENT TOPICAL ONCE
OUTPATIENT
Start: 2023-08-10 | End: 2023-08-10

## 2023-08-10 RX ORDER — LIDOCAINE HYDROCHLORIDE 20 MG/ML
JELLY TOPICAL ONCE
OUTPATIENT
Start: 2023-08-10 | End: 2023-08-10

## 2023-08-10 RX ORDER — IBUPROFEN 200 MG
TABLET ORAL ONCE
OUTPATIENT
Start: 2023-08-10 | End: 2023-08-10

## 2023-08-10 RX ORDER — GENTAMICIN SULFATE 1 MG/G
OINTMENT TOPICAL ONCE
OUTPATIENT
Start: 2023-08-10 | End: 2023-08-10

## 2023-08-10 RX ORDER — GINSENG 100 MG
CAPSULE ORAL ONCE
OUTPATIENT
Start: 2023-08-10 | End: 2023-08-10

## 2023-08-10 RX ORDER — LIDOCAINE HYDROCHLORIDE 40 MG/ML
SOLUTION TOPICAL ONCE
OUTPATIENT
Start: 2023-08-10 | End: 2023-08-10

## 2023-08-10 RX ORDER — LIDOCAINE 50 MG/G
OINTMENT TOPICAL ONCE
OUTPATIENT
Start: 2023-08-10 | End: 2023-08-10

## 2023-08-10 RX ORDER — LIDOCAINE 40 MG/G
CREAM TOPICAL ONCE
OUTPATIENT
Start: 2023-08-10 | End: 2023-08-10

## 2023-08-10 RX ADMIN — LIDOCAINE HYDROCHLORIDE 10 ML: 40 SOLUTION TOPICAL at 15:28

## 2023-08-10 ASSESSMENT — PAIN SCALES - GENERAL
PAINLEVEL_OUTOF10: 0
PAINLEVEL_OUTOF10: 0

## 2023-08-10 NOTE — PROGRESS NOTES
Healing % 99 08/10/23 1522   Post-Procedure Length (cm) 6.6 cm 08/10/23 1547   Post-Procedure Width (cm) 4.1 cm 08/10/23 1547   Post-Procedure Depth (cm) 0.1 cm 08/10/23 1547   Post-Procedure Surface Area (cm^2) 27.06 cm^2 08/10/23 1547   Post-Procedure Volume (cm^3) 2. 706 cm^3 08/10/23 1547   Wound Assessment Dry;Granulation tissue;Slough 08/10/23 1522   Drainage Amount Small (< 25%) 08/10/23 1522   Drainage Description Serosanguinous 08/10/23 1522   Odor None 08/10/23 1522   Irina-wound Assessment Dry/flaky;Fragile 08/10/23 1522   Margins Attached edges 08/10/23 1522   Wound Thickness Description not for Pressure Injury Full thickness 08/10/23 1522   Number of days: 188       Wound 02/02/23 Arm Right #2 (Noted 2/2022) (Active)   Wound Image    08/10/23 1522   Wound Etiology Traumatic 03/16/23 1530   Dressing Status New dressing applied 07/27/23 1622   Wound Cleansed Cleansed with saline 07/27/23 1622   Dressing/Treatment Collagen;Moisten with saline; Roll gauze; Other (comment);ABD;Gauze dressing/dressing sponge;Coban/self-adherent bandages 07/27/23 1622   Wound Length (cm) 14.5 cm 08/10/23 1522   Wound Width (cm) 16 cm 08/10/23 1522   Wound Depth (cm) 0.1 cm 08/10/23 1522   Wound Surface Area (cm^2) 232 cm^2 08/10/23 1522   Change in Wound Size % (l*w) 59.3 08/10/23 1522   Wound Volume (cm^3) 23.2 cm^3 08/10/23 1522   Wound Healing % 95 08/10/23 1522   Post-Procedure Length (cm) 14.6 cm 08/10/23 1547   Post-Procedure Width (cm) 16.1 cm 08/10/23 1547   Post-Procedure Depth (cm) 0.1 cm 08/10/23 1547   Post-Procedure Surface Area (cm^2) 235.06 cm^2 08/10/23 1547   Post-Procedure Volume (cm^3) 23.506 cm^3 08/10/23 1547   Wound Assessment Granulation tissue;Slough; Hyper granulation tissue 08/10/23 1522   Drainage Amount Moderate (25-50%) 08/10/23 1522   Drainage Description Serosanguinous 08/10/23 1522   Odor None 08/10/23 1522   Irina-wound Assessment Dry/flaky 08/10/23 1522   Margins Attached edges 08/10/23 1522   Wound

## 2023-08-22 NOTE — DISCHARGE INSTRUCTIONS
[address] [place] Physician Orders and Discharge [address] [place] [address]  [address], 1 [facility],[address], [address]  Telephone: [phone] [address] 8:00 am - 4:30 pm and Friday 8:00 am - 12:00 pm.          NAME:  Spring Emmanuel  YOB: 1970  MEDICAL RECORD NUMBER:  [id number]  DATE:  8/24/2023     Important Reminders:   Please wash hands with soap and water before and after every dressing change. Do not scrub wounds. Keep wounds dry in shower unless otherwise instructed by the physician. SMOKING can slow would healing. Stop smoking as soon as possible to improve healing and prevent further complications associated with smoking. Irina-Wound Topical Treatments:  Do not apply lotions, creams, or ointments to wound bed unless directed. [] Apply moisturizing lotion to skin surrounding the wound prior to dressing change.  [] Apply antifungal ointment to skin surrounding the wound prior to dressing change.  [] Apply thin film of no sting moisture barrier ointment to skin immediately around      wound. [] Other:         Wound Location: RIGHT AND LEFT LOWER ARM WOUNDS     Wound Cleansing:      Primary Dressing:  [x] PLAIN COLLAGEN SLIGHTLY MOISTENED WITH SALINE  []      Secondary Dressing:  [x] DRY GAUZE (ABD PADS)  [x] ROLL GAUZE        COBAN APPLIED LIGHTLY TO KEEP DRESSING IN PLACE        Dressing Frequency:  [x] THREE TIMES PER WEEK  (MAY SOAK DRESSING WITH SALINE TO REMOVE DRESSING IF NEEDED  )  [] Do Not Change Dressing                                                   [] Assistive Devices     Use as instructed by the provider        Activity: Activity as Tolerated        Dietary:   Continue your diet as tolerated. Protein is a key nutrient in helping to repair damaged tissue and promote new tissue growth. Good sources of protein include milk, yogurt, cheese, fish, lean meat and beans.   If you are DIABETIC,

## 2023-08-24 ENCOUNTER — HOSPITAL ENCOUNTER (OUTPATIENT)
Dept: ULTRASOUND IMAGING | Age: 53
Discharge: HOME | End: 2023-08-24
Attending: INTERNAL MEDICINE
Payer: COMMERCIAL

## 2023-08-24 ENCOUNTER — HOSPITAL ENCOUNTER (OUTPATIENT)
Dept: WOUND CARE | Age: 53
Discharge: HOME | End: 2023-08-24
Payer: COMMERCIAL

## 2023-08-24 VITALS
RESPIRATION RATE: 18 BRPM | TEMPERATURE: 98.9 F | HEART RATE: 82 BPM | SYSTOLIC BLOOD PRESSURE: 137 MMHG | DIASTOLIC BLOOD PRESSURE: 74 MMHG

## 2023-08-24 DIAGNOSIS — L02.213 CUTANEOUS ABSCESS OF CHEST WALL: ICD-10-CM

## 2023-08-24 DIAGNOSIS — S41.102A OPEN WOUND OF ARM, BILATERAL: Primary | ICD-10-CM

## 2023-08-24 DIAGNOSIS — R60.0 LOCALIZED EDEMA: ICD-10-CM

## 2023-08-24 DIAGNOSIS — L03.119 CELLULITIS OF UPPER EXTREMITY, UNSPECIFIED LATERALITY: ICD-10-CM

## 2023-08-24 DIAGNOSIS — F19.10 DRUG ABUSE, IV (HCC): ICD-10-CM

## 2023-08-24 DIAGNOSIS — L03.113 CELLULITIS OF RIGHT UPPER EXTREMITY: ICD-10-CM

## 2023-08-24 DIAGNOSIS — R60.9 EDEMA, UNSPECIFIED TYPE: ICD-10-CM

## 2023-08-24 DIAGNOSIS — S41.101A OPEN WOUND OF ARM, BILATERAL: Primary | ICD-10-CM

## 2023-08-24 PROCEDURE — 11042 DBRDMT SUBQ TIS 1ST 20SQCM/<: CPT

## 2023-08-24 PROCEDURE — 11045 DBRDMT SUBQ TISS EACH ADDL: CPT

## 2023-08-24 PROCEDURE — 76700 US EXAM ABDOM COMPLETE: CPT

## 2023-08-24 RX ORDER — BETAMETHASONE DIPROPIONATE 0.05 %
OINTMENT (GRAM) TOPICAL ONCE
OUTPATIENT
Start: 2023-08-24 | End: 2023-08-24

## 2023-08-24 RX ORDER — LIDOCAINE HYDROCHLORIDE 20 MG/ML
JELLY TOPICAL ONCE
OUTPATIENT
Start: 2023-08-24 | End: 2023-08-24

## 2023-08-24 RX ORDER — LIDOCAINE HYDROCHLORIDE 40 MG/ML
SOLUTION TOPICAL ONCE
Status: COMPLETED | OUTPATIENT
Start: 2023-08-24 | End: 2023-08-24

## 2023-08-24 RX ORDER — LIDOCAINE HYDROCHLORIDE 40 MG/ML
SOLUTION TOPICAL ONCE
OUTPATIENT
Start: 2023-08-24 | End: 2023-08-24

## 2023-08-24 RX ORDER — LIDOCAINE 40 MG/G
CREAM TOPICAL ONCE
OUTPATIENT
Start: 2023-08-24 | End: 2023-08-24

## 2023-08-24 RX ORDER — LIDOCAINE 50 MG/G
OINTMENT TOPICAL ONCE
OUTPATIENT
Start: 2023-08-24 | End: 2023-08-24

## 2023-08-24 RX ORDER — CLOBETASOL PROPIONATE 0.5 MG/G
OINTMENT TOPICAL ONCE
OUTPATIENT
Start: 2023-08-24 | End: 2023-08-24

## 2023-08-24 RX ORDER — GINSENG 100 MG
CAPSULE ORAL ONCE
OUTPATIENT
Start: 2023-08-24 | End: 2023-08-24

## 2023-08-24 RX ORDER — GENTAMICIN SULFATE 1 MG/G
OINTMENT TOPICAL ONCE
OUTPATIENT
Start: 2023-08-24 | End: 2023-08-24

## 2023-08-24 RX ORDER — IBUPROFEN 200 MG
TABLET ORAL ONCE
OUTPATIENT
Start: 2023-08-24 | End: 2023-08-24

## 2023-08-24 RX ORDER — BACITRACIN ZINC AND POLYMYXIN B SULFATE 500; 1000 [USP'U]/G; [USP'U]/G
OINTMENT TOPICAL ONCE
OUTPATIENT
Start: 2023-08-24 | End: 2023-08-24

## 2023-08-24 RX ADMIN — LIDOCAINE HYDROCHLORIDE: 40 SOLUTION TOPICAL at 15:31

## 2023-08-24 ASSESSMENT — PAIN SCALES - GENERAL
PAINLEVEL_OUTOF10: 0
PAINLEVEL_OUTOF10: 0

## 2023-08-24 NOTE — PROGRESS NOTES
Information: Should you experience any significant changes in your wound(s) or have questions about your wound care, please contact the [facility] at [address] [place] [address] Avenue 8:00 am - 4:30 pm and Friday 8:00 am - 12:30 pm.  If you need help with your wound outside these hours and cannot wait until we are again available, contact your PCP or go to the hospital emergency room. PLEASE NOTE: IF YOU ARE UNABLE TO OBTAIN WOUND SUPPLIES, CONTINUE TO USE THE SUPPLIES YOU HAVE AVAILABLE UNTIL YOU ARE ABLE TO REACH US. IT IS MOST IMPORTANT TO KEEP THE WOUND COVERED AT ALL TIMES.      Physician Signature:_______________________     Date: ___________ Time:  ____________                                  Dr Dulce Qureshi        Electronically signed by Husam Valenzuela MD on 8/24/2023 at 2:24 PM

## 2023-08-25 ENCOUNTER — HOSPITAL ENCOUNTER (OUTPATIENT)
Dept: VASCULAR LAB | Age: 53
Discharge: HOME | End: 2023-08-25
Attending: SURGERY
Payer: COMMERCIAL

## 2023-08-25 DIAGNOSIS — R60.0 LOCALIZED EDEMA: ICD-10-CM

## 2023-08-25 PROCEDURE — 93970 EXTREMITY STUDY: CPT

## 2023-09-22 ENCOUNTER — APPOINTMENT (OUTPATIENT)
Dept: CARDIOLOGY | Age: 53
End: 2023-09-22
Payer: COMMERCIAL

## (undated) DEVICE — BANDAGE COBAN 4 IN COMPR W4INXL5YD FOAM COHESIVE QUIK STK SELF ADH SFT

## (undated) DEVICE — TOWEL,OR,DSP,ST,BLUE,STD,4/PK,20PK/CS: Brand: MEDLINE

## (undated) DEVICE — CURETTE SURG 5MM GRN S STL DERM SYMMETRICALLY GRND BVL EDGE

## (undated) DEVICE — GAUZE,SPONGE,4"X4",8PLY,STRL,LF,10/TRAY: Brand: MEDLINE

## (undated) DEVICE — INTENDED FOR TISSUE SEPARATION, AND OTHER PROCEDURES THAT REQUIRE A SHARP SURGICAL BLADE TO PUNCTURE OR CUT.: Brand: BARD-PARKER ® STAINLESS STEEL BLADES

## (undated) DEVICE — SOLUTION IRRIG 500ML 0.9% SOD CHLO USP POUR PLAS BTL

## (undated) DEVICE — SHEET,DRAPE,53X77,STERILE: Brand: MEDLINE

## (undated) DEVICE — BLADE ES ELASTOMERIC COAT INSUL DURABLE BEND UPTO 90DEG

## (undated) DEVICE — BANDAGE,GAUZE,BULKEE II,4.5"X4.1YD,STRL: Brand: MEDLINE

## (undated) DEVICE — HYPODERMIC SAFETY NEEDLE: Brand: MAGELLAN

## (undated) DEVICE — ELECTRODE PT RET AD L9FT HI MOIST COND ADH HYDRGEL CORDED

## (undated) DEVICE — SHEET,DRAPE,40X58,STERILE: Brand: MEDLINE

## (undated) DEVICE — MERCY FAIRFIELD TURNOVER KIT: Brand: MEDLINE INDUSTRIES, INC.

## (undated) DEVICE — COVER LT HNDL BLU PLAS

## (undated) DEVICE — PENCIL ES L3M BTTN SWCH S STL HEX LOK BLDE ELECTRD HOLSTER

## (undated) DEVICE — DRESSING PETRO W3XL8IN N ADH OIL EMUL GZ CURAD

## (undated) DEVICE — BLANKET WRM W40.2XL55.9IN IORT LO BODY + MISTRAL AIR

## (undated) DEVICE — GLOVE SURG SZ 6.5 L11.2IN FNGR THK9.8MIL STRW LTX POLYMER

## (undated) DEVICE — SYRINGE, LUER LOCK, 10ML: Brand: MEDLINE